# Patient Record
Sex: MALE | Race: WHITE | Employment: OTHER | URBAN - METROPOLITAN AREA
[De-identification: names, ages, dates, MRNs, and addresses within clinical notes are randomized per-mention and may not be internally consistent; named-entity substitution may affect disease eponyms.]

---

## 2017-01-03 ENCOUNTER — TELEPHONE (OUTPATIENT)
Dept: FAMILY MEDICINE CLINIC | Facility: CLINIC | Age: 82
End: 2017-01-03

## 2017-01-08 ENCOUNTER — HOSPITAL ENCOUNTER (EMERGENCY)
Facility: HOSPITAL | Age: 82
Discharge: HOME | End: 2017-01-08
Attending: EMERGENCY MEDICINE
Payer: MEDICARE

## 2017-01-08 VITALS
RESPIRATION RATE: 18 BRPM | WEIGHT: 150 LBS | DIASTOLIC BLOOD PRESSURE: 79 MMHG | BODY MASS INDEX: 24.11 KG/M2 | OXYGEN SATURATION: 94 % | TEMPERATURE: 99 F | HEIGHT: 66 IN | HEART RATE: 77 BPM | SYSTOLIC BLOOD PRESSURE: 131 MMHG
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
## 2017-01-08 PROCEDURE — 99282 EMERGENCY DEPT VISIT SF MDM: CPT

## 2017-01-08 PROCEDURE — 99283 EMERGENCY DEPT VISIT LOW MDM: CPT

## 2017-01-08 RX ORDER — DIAPER,BRIEF,INFANT-TODD,DISP
1 EACH MISCELLANEOUS 2 TIMES DAILY
Qty: 56 G | Refills: 0 | Status: ON HOLD | OUTPATIENT
Start: 2017-01-08 | End: 2018-08-08

## 2017-01-08 NOTE — ED PROVIDER NOTES
Patient Seen in: [facility] Emergency Department    History   Patient presents with:  Constipation (gastrointestinal)  Rash Skin Problem (integumentary)    Stated Complaint: constipation    HPI    59-year-old male presents complaining of rash to upp prostatectomy    OTHER SURGICAL HISTORY      Comment bladder surgery    OTHER SURGICAL HISTORY  1/1/95    Comment testis removed    OTHER SURGICAL HISTORY  6/7/13    Comment Cysto-Dr. Barrett ING HERNIA,5+Y/O,REDU HPI.  Constitutional and vital signs reviewed. All other systems reviewed and negative except as noted above. PSFH elements reviewed from today and agreed except as otherwise stated in HPI.     Physical Exam       ED Triage Vitals   BP 01/08/17 1435 him to check his mattress at home for insects. In the meantime I will prescribe him with hydrocortisone ointment to apply to the area. I have also recommended that he follow-up with dermatology if this is not improving in the next 2-3 days.   In regards t

## 2017-01-08 NOTE — ED NOTES
Pt states he is here today for constipation and red, flat rash to upper extremities. Pt states rash is very itchy and was given medication for it but states it is not working.

## 2017-01-08 NOTE — ED INITIAL ASSESSMENT (HPI)
Pt to ED with c/o constipation. Sts LBM was this am but was not normal. Sts \"was a small amount and I feel miserable. \" Denies N/V. Also c/o rash to bilateral arms. Denies fever.

## 2017-01-20 RX ORDER — SERTRALINE HYDROCHLORIDE 100 MG/1
TABLET, FILM COATED ORAL
Qty: 90 TABLET | Refills: 0 | Status: SHIPPED | OUTPATIENT
Start: 2017-01-20 | End: 2017-04-10

## 2017-01-24 RX ORDER — ALENDRONATE SODIUM 70 MG/1
TABLET ORAL
Qty: 12 TABLET | Refills: 0 | Status: SHIPPED | OUTPATIENT
Start: 2017-01-24 | End: 2017-06-23

## 2017-02-28 ENCOUNTER — TELEPHONE (OUTPATIENT)
Dept: FAMILY MEDICINE CLINIC | Facility: CLINIC | Age: 82
End: 2017-02-28

## 2017-02-28 RX ORDER — BICALUTAMIDE 50 MG/1
TABLET, FILM COATED ORAL
Qty: 90 TABLET | Refills: 0 | Status: SHIPPED | OUTPATIENT
Start: 2017-02-28 | End: 2017-03-21

## 2017-03-01 RX ORDER — BICALUTAMIDE 50 MG/1
TABLET, FILM COATED ORAL
Qty: 90 TABLET | Refills: 0 | Status: SHIPPED | OUTPATIENT
Start: 2017-03-01 | End: 2018-04-25

## 2017-03-06 RX ORDER — LISINOPRIL 5 MG/1
TABLET ORAL
Qty: 30 TABLET | Refills: 0 | Status: SHIPPED | OUTPATIENT
Start: 2017-03-06 | End: 2017-05-15 | Stop reason: SDUPTHER

## 2017-03-06 RX ORDER — FAMOTIDINE 20 MG/1
TABLET ORAL
Qty: 60 TABLET | Refills: 0 | Status: SHIPPED | OUTPATIENT
Start: 2017-03-06 | End: 2017-05-18

## 2017-03-21 ENCOUNTER — OFFICE VISIT (OUTPATIENT)
Dept: FAMILY MEDICINE CLINIC | Facility: CLINIC | Age: 82
End: 2017-03-21

## 2017-03-21 VITALS
BODY MASS INDEX: 26.03 KG/M2 | WEIGHT: 162 LBS | DIASTOLIC BLOOD PRESSURE: 82 MMHG | HEART RATE: 68 BPM | SYSTOLIC BLOOD PRESSURE: 136 MMHG | RESPIRATION RATE: 16 BRPM | HEIGHT: 66 IN | TEMPERATURE: 98 F | OXYGEN SATURATION: 98 %

## 2017-03-21 DIAGNOSIS — R09.82 POST-NASAL DRAINAGE: ICD-10-CM

## 2017-03-21 DIAGNOSIS — R33.9 URINARY RETENTION: ICD-10-CM

## 2017-03-21 DIAGNOSIS — K59.00 CONSTIPATION, UNSPECIFIED CONSTIPATION TYPE: Primary | ICD-10-CM

## 2017-03-21 PROCEDURE — 99214 OFFICE O/P EST MOD 30 MIN: CPT | Performed by: INTERNAL MEDICINE

## 2017-03-21 RX ORDER — GARLIC EXTRACT 500 MG
1 CAPSULE ORAL DAILY
Status: ON HOLD | COMMUNITY
End: 2018-08-08

## 2017-03-21 RX ORDER — LORATADINE 10 MG/1
10 TABLET ORAL DAILY
COMMUNITY
End: 2018-11-23

## 2017-03-21 RX ORDER — ACETAMINOPHEN 500 MG
500 TABLET ORAL EVERY 6 HOURS PRN
Status: ON HOLD | COMMUNITY
End: 2018-07-11

## 2017-03-21 NOTE — PROGRESS NOTES
Shereen Briones is a 80year old male. HPI:   Pt here with chronic constipation. Seen by GI, advised Miralax and glycerin suppositories. Pt reports this isn't working. Continues to feel the need to defecate.  interfering with his social and personal re rhinitis    • Unspecified essential hypertension    • Cancer ([facility] [address]      PROSTATE   • Other and unspecified hyperlipidemia    • Personal history of other musculoskeletal disorders    • OTHER DISEASES    • Colitis      DIVERTICULITIS   • Depression    • [facility]. No orders of the defined types were placed in this encounter.      None      acetaminophen 500 MG Oral Tab  Acidophilus/Pectin Oral Cap  loratadine 10 MG Oral Tab    The patient indicates understanding of these issues and agre

## 2017-03-22 ENCOUNTER — TELEPHONE (OUTPATIENT)
Dept: FAMILY MEDICINE CLINIC | Facility: CLINIC | Age: 82
End: 2017-03-22

## 2017-03-23 PROCEDURE — 87086 URINE CULTURE/COLONY COUNT: CPT | Performed by: UROLOGY

## 2017-04-07 ENCOUNTER — TELEPHONE (OUTPATIENT)
Dept: FAMILY MEDICINE CLINIC | Facility: CLINIC | Age: 82
End: 2017-04-07

## 2017-04-07 RX ORDER — ALPRAZOLAM 0.5 MG/1
TABLET ORAL
Qty: 30 TABLET | Refills: 0 | Status: ON HOLD | COMMUNITY
Start: 2017-04-07 | End: 2018-07-11

## 2017-04-07 NOTE — TELEPHONE ENCOUNTER
Obtained RX from [facility]. Spoke with Ismael Echevarria, friend of Stacy Barlow, and informed her [facility] has prescribed the generic for Xanax, it has been phoned into pt's [facility] and also gave warnings about drowsiness, increased fall risk.  She expressed understanding and agreeme

## 2017-04-11 RX ORDER — SERTRALINE HYDROCHLORIDE 100 MG/1
TABLET, FILM COATED ORAL
Qty: 90 TABLET | Refills: 0 | Status: SHIPPED | OUTPATIENT
Start: 2017-04-11 | End: 2017-07-08

## 2017-04-11 RX ORDER — LISINOPRIL 5 MG/1
TABLET ORAL
Qty: 30 TABLET | Refills: 0 | Status: SHIPPED | OUTPATIENT
Start: 2017-04-11 | End: 2017-05-04

## 2017-05-04 NOTE — PROGRESS NOTES
Patient is here for medication refills. He suffers from anxiety but is now successfully off alprazolam.  He is on sertraline.   He suffers from irritable bowel-like symptoms that are rather disabling he is on sertraline and 100 mg daily and this can produc

## 2017-05-15 RX ORDER — LISINOPRIL 5 MG/1
TABLET ORAL
Qty: 90 TABLET | Refills: 1 | Status: SHIPPED | OUTPATIENT
Start: 2017-05-15 | End: 2017-10-29

## 2017-05-18 RX ORDER — FAMOTIDINE 20 MG/1
TABLET ORAL
Qty: 180 TABLET | Refills: 0 | Status: SHIPPED | OUTPATIENT
Start: 2017-05-18 | End: 2017-08-19

## 2017-06-01 RX ORDER — BICALUTAMIDE 50 MG/1
TABLET, FILM COATED ORAL
Qty: 90 TABLET | Refills: 0 | Status: SHIPPED | OUTPATIENT
Start: 2017-06-01 | End: 2018-03-02

## 2017-06-05 RX ORDER — FAMOTIDINE 20 MG/1
TABLET ORAL
Qty: 180 TABLET | Refills: 0 | OUTPATIENT
Start: 2017-06-05

## 2017-06-11 ENCOUNTER — APPOINTMENT (OUTPATIENT)
Dept: GENERAL RADIOLOGY | Age: 82
End: 2017-06-11
Attending: EMERGENCY MEDICINE
Payer: MEDICARE

## 2017-06-11 ENCOUNTER — HOSPITAL ENCOUNTER (EMERGENCY)
Age: 82
Discharge: HOME | End: 2017-06-11
Attending: EMERGENCY MEDICINE
Payer: MEDICARE

## 2017-06-11 VITALS
DIASTOLIC BLOOD PRESSURE: 65 MMHG | RESPIRATION RATE: 16 BRPM | OXYGEN SATURATION: 100 % | SYSTOLIC BLOOD PRESSURE: 142 MMHG | TEMPERATURE: 99 F | HEART RATE: 78 BPM | HEIGHT: 67 IN

## 2017-06-11 DIAGNOSIS — K59.00 CONSTIPATION, UNSPECIFIED CONSTIPATION TYPE: ICD-10-CM

## 2017-06-11 DIAGNOSIS — N39.0 URINARY TRACT INFECTION WITHOUT HEMATURIA, SITE UNSPECIFIED: Primary | ICD-10-CM

## 2017-06-11 PROCEDURE — 81001 URINALYSIS AUTO W/SCOPE: CPT | Performed by: EMERGENCY MEDICINE

## 2017-06-11 PROCEDURE — 74000 XR ABDOMEN (KUB) (1 AP VIEW)  (CPT=74000): CPT | Performed by: EMERGENCY MEDICINE

## 2017-06-11 PROCEDURE — 87186 SC STD MICRODIL/AGAR DIL: CPT | Performed by: EMERGENCY MEDICINE

## 2017-06-11 PROCEDURE — 87077 CULTURE AEROBIC IDENTIFY: CPT | Performed by: EMERGENCY MEDICINE

## 2017-06-11 PROCEDURE — 99284 EMERGENCY DEPT VISIT MOD MDM: CPT

## 2017-06-11 PROCEDURE — 87086 URINE CULTURE/COLONY COUNT: CPT | Performed by: EMERGENCY MEDICINE

## 2017-06-11 PROCEDURE — 80053 COMPREHEN METABOLIC PANEL: CPT | Performed by: EMERGENCY MEDICINE

## 2017-06-11 PROCEDURE — 96361 HYDRATE IV INFUSION ADD-ON: CPT

## 2017-06-11 PROCEDURE — 96360 HYDRATION IV INFUSION INIT: CPT

## 2017-06-11 PROCEDURE — 85025 COMPLETE CBC W/AUTO DIFF WBC: CPT | Performed by: EMERGENCY MEDICINE

## 2017-06-11 PROCEDURE — 71020 XR CHEST PA + LAT CHEST (CPT=71020): CPT | Performed by: EMERGENCY MEDICINE

## 2017-06-11 RX ORDER — CIPROFLOXACIN 500 MG/1
500 TABLET, FILM COATED ORAL 2 TIMES DAILY
Qty: 20 TABLET | Refills: 0 | Status: SHIPPED | OUTPATIENT
Start: 2017-06-11 | End: 2017-06-21

## 2017-06-11 RX ORDER — LISINOPRIL 10 MG/1
10 TABLET ORAL DAILY
Status: ON HOLD | COMMUNITY
End: 2018-07-11

## 2017-06-11 RX ORDER — POLYETHYLENE GLYCOL 3350 17 G/17G
17 POWDER, FOR SOLUTION ORAL DAILY PRN
Qty: 12 EACH | Refills: 0 | Status: SHIPPED | OUTPATIENT
Start: 2017-06-11 | End: 2017-07-11

## 2017-06-11 NOTE — ED PROVIDER NOTES
Patient Seen in: [facility] Emergency Department In [place]    History   Patient presents with:  Dehydration (metabolic/constitutional)    Stated Complaint: weakness, loss of appetite    HPI    80-year-old male presents for evaluation of weakness.   Viktor ESOPHAGOGASTRODUODENOSCOPY (EGD); Surgeon: Ana Lundberg MD;  Location:  ENDOSCOPY       Medications :   lisinopril 10 MG Oral Tab,  Take 10 mg by mouth daily.    Ciprofloxacin HCl 500 MG Oral Tab,  Take 1 tablet (500 mg total) by mouth 2 (two) times 0928 135/87 mmHg   Pulse 06/11/17 0928 104   Resp 06/11/17 0928 18   Temp 06/11/17 0928 98.7 °F (37.1 °C)   Temp src 06/11/17 0928 Temporal   SpO2 06/11/17 0928 96 %   O2 Device 06/11/17 0928 None (Room air)       Current:/65 mmHg  Pulse 78  Temp(Src Final result                 Please view results for these tests on the individual orders.    RAINBOW DRAW BLUE   RAINBOW DRAW GOLD   URINE CULTURE, ROUTINE   CBC W/ DIFFERENTIAL       MDM     Medications   sodium chloride 0.9% IV bolus 1,000 mL (0 m

## 2017-06-11 NOTE — ED INITIAL ASSESSMENT (HPI)
Pt sts \"I have had nasal congestion for weeks, feeling weak, losing appetite but able to eat and drink, has been constipated\", denies nausea/vomiting/pain

## 2017-06-14 RX ORDER — SULFAMETHOXAZOLE AND TRIMETHOPRIM 800; 160 MG/1; MG/1
1 TABLET ORAL 2 TIMES DAILY
Qty: 20 TABLET | Refills: 0 | Status: SHIPPED | OUTPATIENT
Start: 2017-06-14 | End: 2017-06-24

## 2017-06-14 NOTE — ED NOTES
RECEIVED FINAL RESULT- PT IS RESISTANT TO CIPRO, CHANGED IT TO BACTRIM. CALLED PT AND SON AND INFORMED OF CHANGES. BACTRIM RX CALLED IN TO [address] [place] [address].

## 2017-06-15 ENCOUNTER — TELEPHONE (OUTPATIENT)
Dept: FAMILY MEDICINE CLINIC | Facility: CLINIC | Age: 82
End: 2017-06-15

## 2017-06-15 NOTE — TELEPHONE ENCOUNTER
Pt called to cancel his appt on the same day, pt stated he is feeling sick and does not feel like leaving his home, pt was offered to come in to be treated for his sickness. Pt refused and cancelled the appt.  Pt aware of no show policy and aware that he wi

## 2017-06-27 RX ORDER — ALENDRONATE SODIUM 70 MG/1
TABLET ORAL
Qty: 12 TABLET | Refills: 0 | Status: SHIPPED | OUTPATIENT
Start: 2017-06-27 | End: 2017-09-10

## 2017-07-10 RX ORDER — SERTRALINE HYDROCHLORIDE 100 MG/1
TABLET, FILM COATED ORAL
Qty: 90 TABLET | Refills: 0 | Status: SHIPPED | OUTPATIENT
Start: 2017-07-10 | End: 2018-08-16

## 2017-08-10 PROCEDURE — 87086 URINE CULTURE/COLONY COUNT: CPT | Performed by: UROLOGY

## 2017-08-19 DIAGNOSIS — F41.1 GENERALIZED ANXIETY DISORDER: ICD-10-CM

## 2017-08-21 RX ORDER — TRAZODONE HYDROCHLORIDE 50 MG/1
TABLET ORAL
Qty: 90 TABLET | Refills: 0 | Status: SHIPPED | OUTPATIENT
Start: 2017-08-21 | End: 2017-11-19

## 2017-08-21 RX ORDER — FAMOTIDINE 20 MG/1
TABLET ORAL
Qty: 180 TABLET | Refills: 0 | Status: SHIPPED | OUTPATIENT
Start: 2017-08-21 | End: 2017-11-19

## 2017-08-21 RX ORDER — BICALUTAMIDE 50 MG/1
TABLET, FILM COATED ORAL
Qty: 90 TABLET | Refills: 0 | Status: SHIPPED | OUTPATIENT
Start: 2017-08-21 | End: 2017-11-16

## 2017-09-05 ENCOUNTER — OFFICE VISIT (OUTPATIENT)
Dept: SURGERY | Facility: CLINIC | Age: 82
End: 2017-09-05

## 2017-09-05 VITALS
WEIGHT: 155 LBS | RESPIRATION RATE: 16 BRPM | HEIGHT: 67 IN | BODY MASS INDEX: 24.33 KG/M2 | TEMPERATURE: 98 F | DIASTOLIC BLOOD PRESSURE: 77 MMHG | SYSTOLIC BLOOD PRESSURE: 115 MMHG | HEART RATE: 72 BPM

## 2017-09-05 DIAGNOSIS — K59.00 CONSTIPATION, UNSPECIFIED CONSTIPATION TYPE: Primary | ICD-10-CM

## 2017-09-05 DIAGNOSIS — R19.8 TENESMUS (RECTAL): ICD-10-CM

## 2017-09-05 PROCEDURE — 99203 OFFICE O/P NEW LOW 30 MIN: CPT | Performed by: COLON & RECTAL SURGERY

## 2017-09-05 PROCEDURE — 46600 DIAGNOSTIC ANOSCOPY SPX: CPT | Performed by: COLON & RECTAL SURGERY

## 2017-09-05 RX ORDER — POLYETHYLENE GLYCOL 3350, SODIUM CHLORIDE, SODIUM BICARBONATE, POTASSIUM CHLORIDE 420; 11.2; 5.72; 1.48 G/4L; G/4L; G/4L; G/4L
POWDER, FOR SOLUTION ORAL
Qty: 1 BOTTLE | Refills: 0 | Status: SHIPPED | OUTPATIENT
Start: 2017-09-05 | End: 2018-03-02 | Stop reason: ALTCHOICE

## 2017-09-05 NOTE — PATIENT INSTRUCTIONS
Currently the patient is weary of undergoing colonoscopy given his age. We discussed a less invasive initial approach. We will order a fecal occult blood test in the mean time which may push us to colonoscopy sooner.  We will start with a high fiber diet an

## 2017-09-05 NOTE — H&P
New Patient Visit Note       Active Problems      1. Constipation, unspecified constipation type    2.  Tenesmus (rectal)        Chief Complaint   Constipation and urge to defecate    History of Present Illness     Audra Louie is an 81yo gentleman refe patient does not have a family history of colon cancer in second degree relatives such as grandparents, aunts uncles, cousins, nieces, and nephews.   The patient does not have a family history of uterine cancer in a Mother, Sister, Child or in second degree Onset   • Cancer Father    • Stroke Mother    • MS[other] [OTHER] Daughter      Social History    Marital status:             Spouse name:                       Years of education:                 Number of children:               Social History Ma BY MOUTH EVERY DAY Disp: 90 tablet Rfl: 0   hydrocortisone 1 % External Ointment Apply 1 Application topically 2 (two) times daily. Disp: 56 g Rfl: 0   Saw Palmetto 1000 MG Oral Cap Take by mouth.  Disp:  Rfl:          Review of Systems  The Review of Syste Lymphadenopathy:     He has no cervical adenopathy. Neurological: He is alert and oriented to person, place, and time. Skin: He is not diaphoretic. Psychiatric: He has a normal mood and affect. The perineum was examined.  There was a small amou Referrals   None    Follow Up  Return in about 3 months (around 12/5/2017).     Long Michelle MD

## 2017-09-11 RX ORDER — ALENDRONATE SODIUM 70 MG/1
TABLET ORAL
Qty: 12 TABLET | Refills: 0 | Status: SHIPPED | OUTPATIENT
Start: 2017-09-11 | End: 2017-11-25

## 2017-09-12 ENCOUNTER — PATIENT OUTREACH (OUTPATIENT)
Dept: CASE MANAGEMENT | Age: 82
End: 2017-09-12

## 2017-10-30 RX ORDER — LISINOPRIL 5 MG/1
TABLET ORAL
Qty: 90 TABLET | Refills: 0 | Status: SHIPPED | OUTPATIENT
Start: 2017-10-30 | End: 2018-01-25

## 2017-11-16 ENCOUNTER — OFFICE VISIT (OUTPATIENT)
Dept: FAMILY MEDICINE CLINIC | Facility: CLINIC | Age: 82
End: 2017-11-16

## 2017-11-16 VITALS
BODY MASS INDEX: 24.43 KG/M2 | HEART RATE: 90 BPM | HEIGHT: 66 IN | SYSTOLIC BLOOD PRESSURE: 128 MMHG | WEIGHT: 152 LBS | RESPIRATION RATE: 16 BRPM | DIASTOLIC BLOOD PRESSURE: 82 MMHG | TEMPERATURE: 98 F

## 2017-11-16 DIAGNOSIS — Z00.00 ROUTINE GENERAL MEDICAL EXAMINATION AT A HEALTH CARE FACILITY: ICD-10-CM

## 2017-11-16 DIAGNOSIS — Z00.00 LABORATORY EXAMINATION ORDERED AS PART OF A ROUTINE GENERAL MEDICAL EXAMINATION: Primary | ICD-10-CM

## 2017-11-16 DIAGNOSIS — I10 ESSENTIAL HYPERTENSION: ICD-10-CM

## 2017-11-16 DIAGNOSIS — C61 PROSTATE CANCER (HCC): ICD-10-CM

## 2017-11-16 PROCEDURE — 99213 OFFICE O/P EST LOW 20 MIN: CPT | Performed by: FAMILY MEDICINE

## 2017-11-16 PROCEDURE — G0439 PPPS, SUBSEQ VISIT: HCPCS | Performed by: FAMILY MEDICINE

## 2017-11-16 NOTE — PROGRESS NOTES
Here for annual Medicare wellness exam.    The entire appropriate questions were asked. All medications were reviewed.   These include alendronate 70 mg, Xanax, lisinopril for hypertension, famotidine for reflux esophagitis, and sertraline for anxiety an

## 2017-11-19 DIAGNOSIS — F41.1 GENERALIZED ANXIETY DISORDER: ICD-10-CM

## 2017-11-20 ENCOUNTER — TELEPHONE (OUTPATIENT)
Dept: FAMILY MEDICINE CLINIC | Facility: CLINIC | Age: 82
End: 2017-11-20

## 2017-11-20 RX ORDER — FAMOTIDINE 20 MG/1
TABLET ORAL
Qty: 180 TABLET | Refills: 0 | Status: SHIPPED | OUTPATIENT
Start: 2017-11-20 | End: 2018-02-20

## 2017-11-20 RX ORDER — TRAZODONE HYDROCHLORIDE 50 MG/1
TABLET ORAL
Qty: 90 TABLET | Refills: 0 | Status: SHIPPED | OUTPATIENT
Start: 2017-11-20 | End: 2018-02-20

## 2017-11-20 NOTE — TELEPHONE ENCOUNTER
Wants lab orders faxed to them, they have a facility and they can do it onsite for the patient, instead of [facility], fax # [phone]

## 2017-11-21 NOTE — PROGRESS NOTES
Remy Torrez is a 80year old male who presents for a Medicare Annual Wellness visit.     Patient Care Team: Patient Care Team:  Andi uFller DO as PCP - [facility])    Patient Active Problem List:     Disorders of bursae and tendon abdominal pain     Hypernatremia     Esophageal reflux     Lumbago      Current Outpatient Prescriptions:  LISINOPRIL 5 MG Oral Tab TAKE 1 TABLET(5 MG) BY MOUTH DAILY Disp: 90 tablet Rfl: 0   Trospium Chloride ER 60 MG Oral Capsule SR 24 Hr Take 1 capsule results found for: CHOLEST  No results found for: HDL  No results found for: TRIG, TRIGLY  No results found for: LDL    Lab Results  Component Value Date   AST 33 06/11/2017   AST 16 09/30/2016   AST 8 (L) 12/16/2015       Lab Results  Component Value Date 12 months?: 0-No                                        Fall/Risk Scorin          Depression Screening (PHQ-2/PHQ-9): Over the LAST 2 WEEKS   Little interest or pleasure in doing things (over the last two weeks)?: Not at all    Feeling down, depressed, patient. Update Health Maintenance if applicable   Immunizations      Influenza No orders found for this or any previous visit.  Update Immunization Activity if applicable    Pneumococcal   Orders placed or performed in visit on 10/11/16  -PNEUMOCOCCAL VAC ALENDRONATE SODIUM 70 MG Oral Tab TAKE 1 TABLET BY MOUTH EVERY SATURDAY ON AN EMPTY STOMACH Disp: 12 tablet Rfl: 0   PEG 3350-KCl-Na Bicarb-NaCl (TRILYTE) 420 g Oral Recon Soln Starting at 4:00 pm the night before procedure, drink 8 ounces of the prep ev • Personal history of antineoplastic chemotherapy     1980S   • Personal history of contact with and (suspected) exposure to asbestos    • Unspecified essential hypertension    • Visual impairment     glasses      Past Surgical History:  6/3/11: BACK CHETAN WITH DIFFERENTIAL WITH PLATELET  -     COMP METABOLIC PANEL (14)  -     PSA  -     LIPID PANEL  -     TSH W REFLEX TO FREE T4    Prostate cancer ([facility] [address]  Labs ordered  Survivor doing well. He is neurologically intact.   Essential hypertension  Labs ordered

## 2017-11-26 RX ORDER — BICALUTAMIDE 50 MG/1
TABLET, FILM COATED ORAL
Qty: 90 TABLET | Refills: 0 | Status: SHIPPED | OUTPATIENT
Start: 2017-11-26 | End: 2018-03-02

## 2017-11-26 RX ORDER — ALENDRONATE SODIUM 70 MG/1
TABLET ORAL
Qty: 12 TABLET | Refills: 0 | Status: SHIPPED | OUTPATIENT
Start: 2017-11-26 | End: 2018-02-16

## 2018-01-25 RX ORDER — LISINOPRIL 5 MG/1
TABLET ORAL
Qty: 90 TABLET | Refills: 1 | Status: ON HOLD | OUTPATIENT
Start: 2018-01-25 | End: 2018-07-11

## 2018-02-16 DIAGNOSIS — M81.0 OSTEOPOROSIS, UNSPECIFIED OSTEOPOROSIS TYPE, UNSPECIFIED PATHOLOGICAL FRACTURE PRESENCE: Primary | ICD-10-CM

## 2018-02-16 RX ORDER — ALENDRONATE SODIUM 70 MG/1
TABLET ORAL
Qty: 12 TABLET | Refills: 0 | Status: SHIPPED | OUTPATIENT
Start: 2018-02-16 | End: 2018-05-11

## 2018-02-20 DIAGNOSIS — F41.1 GENERALIZED ANXIETY DISORDER: ICD-10-CM

## 2018-02-20 RX ORDER — TRAZODONE HYDROCHLORIDE 50 MG/1
TABLET ORAL
Qty: 90 TABLET | Refills: 0 | Status: SHIPPED | OUTPATIENT
Start: 2018-02-20 | End: 2018-05-20

## 2018-02-20 RX ORDER — FAMOTIDINE 20 MG/1
TABLET ORAL
Qty: 180 TABLET | Refills: 0 | Status: SHIPPED | OUTPATIENT
Start: 2018-02-20 | End: 2018-05-20

## 2018-03-02 ENCOUNTER — OFFICE VISIT (OUTPATIENT)
Dept: FAMILY MEDICINE CLINIC | Facility: CLINIC | Age: 83
End: 2018-03-02

## 2018-03-02 VITALS
TEMPERATURE: 98 F | BODY MASS INDEX: 24.11 KG/M2 | RESPIRATION RATE: 18 BRPM | DIASTOLIC BLOOD PRESSURE: 74 MMHG | HEART RATE: 90 BPM | SYSTOLIC BLOOD PRESSURE: 126 MMHG | OXYGEN SATURATION: 98 % | HEIGHT: 66 IN | WEIGHT: 150 LBS

## 2018-03-02 DIAGNOSIS — J31.0 CHRONIC RHINITIS: ICD-10-CM

## 2018-03-02 DIAGNOSIS — K59.09 OTHER CONSTIPATION: Primary | ICD-10-CM

## 2018-03-02 DIAGNOSIS — F34.1 DYSTHYMIA: ICD-10-CM

## 2018-03-02 PROCEDURE — 99214 OFFICE O/P EST MOD 30 MIN: CPT | Performed by: PHYSICIAN ASSISTANT

## 2018-03-02 RX ORDER — FEXOFENADINE HCL 180 MG/1
TABLET ORAL
Refills: 2 | COMMUNITY
Start: 2018-02-02 | End: 2018-08-16

## 2018-03-02 RX ORDER — BICALUTAMIDE 50 MG/1
TABLET, FILM COATED ORAL
Qty: 90 TABLET | Refills: 0 | Status: ON HOLD | OUTPATIENT
Start: 2018-03-02 | End: 2018-07-11

## 2018-03-02 RX ORDER — FLUTICASONE PROPIONATE 50 MCG
SPRAY, SUSPENSION (ML) NASAL
Refills: 0 | COMMUNITY
Start: 2018-02-01 | End: 2018-04-27

## 2018-03-02 RX ORDER — ESCITALOPRAM OXALATE 5 MG/1
TABLET ORAL
Refills: 0 | Status: ON HOLD | COMMUNITY
Start: 2017-12-05 | End: 2021-03-03

## 2018-03-02 NOTE — PROGRESS NOTES
HPI:   Tanya Ash is a 80year old male who presents for nasal congestion for 1 year. Patient reports congestion, clear  colored nasal discharge. Drainage causes him to cough. He is not taking any medications for this.  States he is always congeste mouth every 12 hours Disp: 30 tablet Rfl: 0   acetaminophen 500 MG Oral Tab Take 500 mg by mouth every 6 (six) hours as needed for Pain. Disp:  Rfl:    Acidophilus/Pectin Oral Cap Take 1 capsule by mouth daily.  Disp:  Rfl:    loratadine 10 MG Oral Tab Take denies ear pain, denies sore throat  LUNGS: denies shortness of breath, +cough from post nasal drip, denies wheezing  CV: denies chest pain on exertion  GI: denies nausea, no abdominal pain, +chronic constipation  NEURO: denies headaches, denies dizziness

## 2018-04-17 ENCOUNTER — TELEPHONE (OUTPATIENT)
Dept: FAMILY MEDICINE CLINIC | Facility: CLINIC | Age: 83
End: 2018-04-17

## 2018-04-17 NOTE — TELEPHONE ENCOUNTER
Called pt, offered to schedule pt with ANDI, gave pt 3 different day/times for next week. He will check when his bus is available and call back.

## 2018-04-17 NOTE — TELEPHONE ENCOUNTER
Patient explained that he is a cancer patient and is having issues with his bowels. He wants to know if he should see Dr. Zaida Little or if he would recommend another doctor to help him with the issue. Combination of Diarrhea and constipation.   He is afraid to l

## 2018-04-25 ENCOUNTER — OFFICE VISIT (OUTPATIENT)
Dept: FAMILY MEDICINE CLINIC | Facility: CLINIC | Age: 83
End: 2018-04-25

## 2018-04-25 VITALS
BODY MASS INDEX: 24.91 KG/M2 | TEMPERATURE: 98 F | SYSTOLIC BLOOD PRESSURE: 134 MMHG | HEART RATE: 70 BPM | WEIGHT: 155 LBS | DIASTOLIC BLOOD PRESSURE: 86 MMHG | RESPIRATION RATE: 18 BRPM | HEIGHT: 66 IN | OXYGEN SATURATION: 98 %

## 2018-04-25 DIAGNOSIS — K52.9 ENTERITIS: Primary | ICD-10-CM

## 2018-04-25 PROCEDURE — 99213 OFFICE O/P EST LOW 20 MIN: CPT | Performed by: FAMILY MEDICINE

## 2018-04-25 RX ORDER — SENNOSIDES 8.6 MG
8.6 TABLET ORAL 2 TIMES DAILY
COMMUNITY
End: 2019-05-01

## 2018-04-25 NOTE — PROGRESS NOTES
Has nonspecific abdominal pain with alternating constipation and diarrhea his appetite however is intact and there is no point localized pain.     Also was getting over bronchitis on today's exam vital signs normal he is upset because his son is leaving tow

## 2018-04-26 ENCOUNTER — TELEPHONE (OUTPATIENT)
Dept: FAMILY MEDICINE CLINIC | Facility: CLINIC | Age: 83
End: 2018-04-26

## 2018-04-26 NOTE — TELEPHONE ENCOUNTER
Pt stated he was here yesterday for an appt with carlos manuel Bruce and pt was told to start a trial of probiotics a pain medication and fluticasone nasonex nasal spray. Pt wants to know if all those medications where sent to his pharmacy in [address] [facility]?  Pt is no

## 2018-04-27 RX ORDER — FLUTICASONE PROPIONATE 50 MCG
SPRAY, SUSPENSION (ML) NASAL
Qty: 1 BOTTLE | Refills: 0 | Status: SHIPPED | OUTPATIENT
Start: 2018-04-27 | End: 2018-07-28

## 2018-04-27 RX ORDER — FLUTICASONE PROPIONATE 50 MCG
SPRAY, SUSPENSION (ML) NASAL
Qty: 1 BOTTLE | Refills: 0 | Status: SHIPPED | OUTPATIENT
Start: 2018-04-27 | End: 2018-04-27

## 2018-04-27 NOTE — TELEPHONE ENCOUNTER
We are trying to avoid giving this gentleman mood altering medicines since he has a remote history of alcohol abuse and confusion at times.   If he has specific needs for pain medicine and we can handle those on a case-by-case basis and he can feel free to

## 2018-04-27 NOTE — TELEPHONE ENCOUNTER
Patient states he spoke with Cassandra Ramirez about pain medication at time of last OV. He is a cancer patient and has painful procedures done frequently. He  does not want Tylenol with codeine as this medication causes constipation. Please advise. Thanks.

## 2018-04-27 NOTE — TELEPHONE ENCOUNTER
Fluticasone is over the counter      He was unsure of his current meds    Moderate cognitive  impairment

## 2018-05-11 DIAGNOSIS — M81.8 OTHER OSTEOPOROSIS, UNSPECIFIED PATHOLOGICAL FRACTURE PRESENCE: Primary | ICD-10-CM

## 2018-05-11 RX ORDER — ALENDRONATE SODIUM 70 MG/1
TABLET ORAL
Qty: 12 TABLET | Refills: 0 | Status: ON HOLD | OUTPATIENT
Start: 2018-05-11 | End: 2018-07-11

## 2018-05-20 DIAGNOSIS — F41.1 GENERALIZED ANXIETY DISORDER: ICD-10-CM

## 2018-05-21 RX ORDER — TRAZODONE HYDROCHLORIDE 50 MG/1
TABLET ORAL
Qty: 90 TABLET | Refills: 0 | Status: SHIPPED | OUTPATIENT
Start: 2018-05-21 | End: 2018-08-19

## 2018-05-21 RX ORDER — FAMOTIDINE 20 MG/1
TABLET ORAL
Qty: 180 TABLET | Refills: 0 | Status: SHIPPED | OUTPATIENT
Start: 2018-05-21 | End: 2018-08-16

## 2018-06-14 RX ORDER — BICALUTAMIDE 50 MG/1
TABLET, FILM COATED ORAL
Qty: 90 TABLET | Refills: 0 | Status: SHIPPED | OUTPATIENT
Start: 2018-06-14 | End: 2018-11-23

## 2018-07-08 ENCOUNTER — HOSPITAL ENCOUNTER (INPATIENT)
Facility: HOSPITAL | Age: 83
LOS: 3 days | Discharge: NURSING, REHAB OR LONG-TERM CARE | DRG: 470 | End: 2018-07-11
Attending: HOSPITALIST | Admitting: HOSPITALIST
Payer: MEDICARE

## 2018-07-08 ENCOUNTER — APPOINTMENT (OUTPATIENT)
Dept: GENERAL RADIOLOGY | Facility: HOSPITAL | Age: 83
DRG: 470 | End: 2018-07-08
Attending: ORTHOPAEDIC SURGERY
Payer: MEDICARE

## 2018-07-08 ENCOUNTER — ANESTHESIA EVENT (OUTPATIENT)
Dept: SURGERY | Facility: HOSPITAL | Age: 83
DRG: 470 | End: 2018-07-08
Payer: MEDICARE

## 2018-07-08 DIAGNOSIS — S72.141A INTERTROCHANTERIC FRACTURE OF RIGHT HIP (HCC): ICD-10-CM

## 2018-07-08 PROBLEM — S72.009A HIP FRACTURE (HCC): Status: ACTIVE | Noted: 2018-07-08

## 2018-07-08 PROCEDURE — 73502 X-RAY EXAM HIP UNI 2-3 VIEWS: CPT | Performed by: ORTHOPAEDIC SURGERY

## 2018-07-08 PROCEDURE — 99223 1ST HOSP IP/OBS HIGH 75: CPT | Performed by: HOSPITALIST

## 2018-07-08 RX ORDER — TRAZODONE HYDROCHLORIDE 50 MG/1
50 TABLET ORAL NIGHTLY
Status: DISCONTINUED | OUTPATIENT
Start: 2018-07-08 | End: 2018-07-11

## 2018-07-08 RX ORDER — HYDROCODONE BITARTRATE AND ACETAMINOPHEN 5; 325 MG/1; MG/1
1 TABLET ORAL EVERY 4 HOURS PRN
Status: DISCONTINUED | OUTPATIENT
Start: 2018-07-08 | End: 2018-07-11

## 2018-07-08 RX ORDER — ACETAMINOPHEN 325 MG/1
650 TABLET ORAL EVERY 6 HOURS PRN
Status: DISCONTINUED | OUTPATIENT
Start: 2018-07-08 | End: 2018-07-11

## 2018-07-08 RX ORDER — HYDROCODONE BITARTRATE AND ACETAMINOPHEN 5; 325 MG/1; MG/1
2 TABLET ORAL EVERY 4 HOURS PRN
Status: DISCONTINUED | OUTPATIENT
Start: 2018-07-08 | End: 2018-07-11

## 2018-07-08 RX ORDER — SODIUM CHLORIDE, SODIUM LACTATE, POTASSIUM CHLORIDE, CALCIUM CHLORIDE 600; 310; 30; 20 MG/100ML; MG/100ML; MG/100ML; MG/100ML
INJECTION, SOLUTION INTRAVENOUS CONTINUOUS
Status: DISCONTINUED | OUTPATIENT
Start: 2018-07-08 | End: 2018-07-11

## 2018-07-08 RX ORDER — TRAZODONE HYDROCHLORIDE 50 MG/1
50 TABLET ORAL NIGHTLY PRN
Status: DISCONTINUED | OUTPATIENT
Start: 2018-07-08 | End: 2018-07-11

## 2018-07-08 RX ORDER — OXYBUTYNIN CHLORIDE 5 MG/1
5 TABLET, EXTENDED RELEASE ORAL DAILY
Status: DISCONTINUED | OUTPATIENT
Start: 2018-07-08 | End: 2018-07-11

## 2018-07-08 RX ORDER — ONDANSETRON 2 MG/ML
8 INJECTION INTRAMUSCULAR; INTRAVENOUS EVERY 6 HOURS PRN
Status: DISCONTINUED | OUTPATIENT
Start: 2018-07-08 | End: 2018-07-11

## 2018-07-08 RX ORDER — ACETAMINOPHEN 325 MG/1
650 TABLET ORAL EVERY 4 HOURS PRN
Status: DISCONTINUED | OUTPATIENT
Start: 2018-07-08 | End: 2018-07-11

## 2018-07-08 RX ORDER — MORPHINE SULFATE 4 MG/ML
2 INJECTION, SOLUTION INTRAMUSCULAR; INTRAVENOUS ONCE
Status: COMPLETED | OUTPATIENT
Start: 2018-07-08 | End: 2018-07-08

## 2018-07-08 RX ORDER — ENOXAPARIN SODIUM 100 MG/ML
40 INJECTION SUBCUTANEOUS DAILY
Status: DISCONTINUED | OUTPATIENT
Start: 2018-07-09 | End: 2018-07-09 | Stop reason: ALTCHOICE

## 2018-07-08 RX ORDER — BICALUTAMIDE 50 MG/1
50 TABLET, FILM COATED ORAL DAILY
Status: DISCONTINUED | OUTPATIENT
Start: 2018-07-09 | End: 2018-07-11

## 2018-07-08 RX ORDER — MORPHINE SULFATE 4 MG/ML
1 INJECTION, SOLUTION INTRAMUSCULAR; INTRAVENOUS EVERY 2 HOUR PRN
Status: DISCONTINUED | OUTPATIENT
Start: 2018-07-08 | End: 2018-07-11

## 2018-07-08 RX ORDER — MORPHINE SULFATE 4 MG/ML
INJECTION, SOLUTION INTRAMUSCULAR; INTRAVENOUS
Status: DISPENSED
Start: 2018-07-08 | End: 2018-07-09

## 2018-07-08 RX ORDER — CETIRIZINE HYDROCHLORIDE 10 MG/1
10 TABLET ORAL DAILY
Status: DISCONTINUED | OUTPATIENT
Start: 2018-07-08 | End: 2018-07-11

## 2018-07-08 RX ORDER — FAMOTIDINE 20 MG/1
20 TABLET ORAL 2 TIMES DAILY
Status: DISCONTINUED | OUTPATIENT
Start: 2018-07-08 | End: 2018-07-11

## 2018-07-08 RX ORDER — ALPRAZOLAM 0.5 MG/1
0.5 TABLET ORAL 3 TIMES DAILY PRN
Status: DISCONTINUED | OUTPATIENT
Start: 2018-07-08 | End: 2018-07-11

## 2018-07-08 RX ORDER — ESCITALOPRAM OXALATE 5 MG/1
5 TABLET ORAL NIGHTLY
Status: DISCONTINUED | OUTPATIENT
Start: 2018-07-08 | End: 2018-07-11

## 2018-07-08 RX ORDER — METOCLOPRAMIDE HYDROCHLORIDE 5 MG/ML
10 INJECTION INTRAMUSCULAR; INTRAVENOUS EVERY 8 HOURS PRN
Status: DISCONTINUED | OUTPATIENT
Start: 2018-07-08 | End: 2018-07-09

## 2018-07-08 RX ORDER — MORPHINE SULFATE 4 MG/ML
2 INJECTION, SOLUTION INTRAMUSCULAR; INTRAVENOUS EVERY 2 HOUR PRN
Status: DISCONTINUED | OUTPATIENT
Start: 2018-07-08 | End: 2018-07-11

## 2018-07-08 NOTE — H&P
[facility]  History and Physical     Tani Adan Patient Status:  Inpatient    12/15/1931 MRN [id number]   [facility] 3SW-A Attending [place] [facility] Day # 0 PCP Roderick Edwards DO     Chief Complaint: fal stimulator placed  9/17/2014: OTHER SURGICAL HISTORY      Comment: Procedure: ESOPHAGOGASTRODUODENOSCOPY (EGD);                  Surgeon: Jignesh Live MD;  Location: [facility]                ENDOSCOPY  No date: REPAIR ING HERNIA,5+Y/O,REDUCIBL    Social Histo tablet Rfl: 0   lisinopril 10 MG Oral Tab Take 10 mg by mouth daily.  Disp:  Rfl:    ALPRAZolam (XANAX) 0.5 MG Oral Tab Take 1 to 2 tablets by mouth every 12 hours Disp: 30 tablet Rfl: 0   acetaminophen 500 MG Oral Tab Take 500 mg by mouth every 6 (six) danielle older than the maximum 7 days allowed. ). No results for input(s): PTP, INR in the last 168 hours. No results for input(s): TROP, CK in the last 168 hours. Imaging: Imaging data reviewed in Epic. ASSESSMENT / PLAN:     1.  Right hip fracture af

## 2018-07-08 NOTE — PLAN OF CARE
Arrived via ambulance from [facility] .  A/o x4. Vss. Right leg outwardly rotated, slight shorter than left. Right pedal pulse strong, cap refill to rle < 3 seconds. Wiggles right toes. Bilateral heel protectors in place.   Chronic duron, at

## 2018-07-09 ENCOUNTER — SURGERY (OUTPATIENT)
Age: 83
End: 2018-07-09

## 2018-07-09 ENCOUNTER — APPOINTMENT (OUTPATIENT)
Dept: GENERAL RADIOLOGY | Facility: HOSPITAL | Age: 83
DRG: 470 | End: 2018-07-09
Attending: ORTHOPAEDIC SURGERY
Payer: MEDICARE

## 2018-07-09 ENCOUNTER — ANESTHESIA (OUTPATIENT)
Dept: SURGERY | Facility: HOSPITAL | Age: 83
DRG: 470 | End: 2018-07-09
Payer: MEDICARE

## 2018-07-09 ENCOUNTER — APPOINTMENT (OUTPATIENT)
Dept: GENERAL RADIOLOGY | Facility: HOSPITAL | Age: 83
DRG: 470 | End: 2018-07-09
Attending: HOSPITALIST
Payer: MEDICARE

## 2018-07-09 LAB
CREAT BLD-MCNC: 0.73 MG/DL (ref 0.7–1.3)
PLATELET # BLD AUTO: 129 10(3)UL (ref 150–450)

## 2018-07-09 PROCEDURE — 73110 X-RAY EXAM OF WRIST: CPT | Performed by: HOSPITALIST

## 2018-07-09 PROCEDURE — 3E0T3BZ INTRODUCTION OF ANESTHETIC AGENT INTO PERIPHERAL NERVES AND PLEXI, PERCUTANEOUS APPROACH: ICD-10-PCS | Performed by: ANESTHESIOLOGY

## 2018-07-09 PROCEDURE — 0SRR0J9 REPLACEMENT OF RIGHT HIP JOINT, FEMORAL SURFACE WITH SYNTHETIC SUBSTITUTE, CEMENTED, OPEN APPROACH: ICD-10-PCS | Performed by: ORTHOPAEDIC SURGERY

## 2018-07-09 PROCEDURE — 73501 X-RAY EXAM HIP UNI 1 VIEW: CPT | Performed by: ORTHOPAEDIC SURGERY

## 2018-07-09 PROCEDURE — 99232 SBSQ HOSP IP/OBS MODERATE 35: CPT | Performed by: HOSPITALIST

## 2018-07-09 DEVICE — SMARTSET GMV HIGH PERFORMANCE GENTAMICIN MEDIUM VISCOSITY BONE CEMENT 40G
Type: IMPLANTABLE DEVICE | Site: HIP | Status: FUNCTIONAL
Brand: SMARTSET

## 2018-07-09 DEVICE — M-SPEC METAL FEMORAL HEAD 12/14 TAPER DIAMETER 28MM +1.5: Type: IMPLANTABLE DEVICE | Site: HIP | Status: FUNCTIONAL

## 2018-07-09 DEVICE — SUMMIT FEMORAL STEM 12/14 TAPER BASIC CEMENTED SIZE 5 121MM
Type: IMPLANTABLE DEVICE | Site: HIP | Status: FUNCTIONAL
Brand: SUMMIT

## 2018-07-09 DEVICE — CEMENTRALIZER STEM CENTRALIZER 9.25MM CEMENTED
Type: IMPLANTABLE DEVICE | Site: HIP | Status: FUNCTIONAL
Brand: CEMENTRALIZER

## 2018-07-09 DEVICE — SELF CENTERING BI-POLAR HEAD 28MM ID 54MM OD
Type: IMPLANTABLE DEVICE | Site: HIP | Status: FUNCTIONAL
Brand: SELF CENTERING

## 2018-07-09 DEVICE — KIT FEM BONE CEMENT RESTRICTOR: Type: IMPLANTABLE DEVICE | Site: HIP | Status: FUNCTIONAL

## 2018-07-09 RX ORDER — CLINDAMYCIN PHOSPHATE 900 MG/50ML
900 INJECTION INTRAVENOUS EVERY 8 HOURS
Status: COMPLETED | OUTPATIENT
Start: 2018-07-09 | End: 2018-07-10

## 2018-07-09 RX ORDER — POLYETHYLENE GLYCOL 3350 17 G/17G
17 POWDER, FOR SOLUTION ORAL DAILY PRN
Status: DISCONTINUED | OUTPATIENT
Start: 2018-07-09 | End: 2018-07-11

## 2018-07-09 RX ORDER — SODIUM CHLORIDE 9 MG/ML
INJECTION, SOLUTION INTRAVENOUS CONTINUOUS
Status: DISCONTINUED | OUTPATIENT
Start: 2018-07-09 | End: 2018-07-11

## 2018-07-09 RX ORDER — MELATONIN
325
Status: DISCONTINUED | OUTPATIENT
Start: 2018-07-10 | End: 2018-07-11

## 2018-07-09 RX ORDER — ONDANSETRON 2 MG/ML
4 INJECTION INTRAMUSCULAR; INTRAVENOUS EVERY 4 HOURS PRN
Status: ACTIVE | OUTPATIENT
Start: 2018-07-09 | End: 2018-07-11

## 2018-07-09 RX ORDER — MORPHINE SULFATE 4 MG/ML
2 INJECTION, SOLUTION INTRAMUSCULAR; INTRAVENOUS EVERY 5 MIN PRN
Status: DISCONTINUED | OUTPATIENT
Start: 2018-07-09 | End: 2018-07-09 | Stop reason: HOSPADM

## 2018-07-09 RX ORDER — ZOLPIDEM TARTRATE 5 MG/1
5 TABLET ORAL NIGHTLY PRN
Status: DISCONTINUED | OUTPATIENT
Start: 2018-07-09 | End: 2018-07-11

## 2018-07-09 RX ORDER — DOCUSATE SODIUM 100 MG/1
100 CAPSULE, LIQUID FILLED ORAL 2 TIMES DAILY
Status: DISCONTINUED | OUTPATIENT
Start: 2018-07-09 | End: 2018-07-11

## 2018-07-09 RX ORDER — MEPERIDINE HYDROCHLORIDE 25 MG/ML
12.5 INJECTION INTRAMUSCULAR; INTRAVENOUS; SUBCUTANEOUS AS NEEDED
Status: DISCONTINUED | OUTPATIENT
Start: 2018-07-09 | End: 2018-07-09 | Stop reason: HOSPADM

## 2018-07-09 RX ORDER — SODIUM CHLORIDE, SODIUM LACTATE, POTASSIUM CHLORIDE, CALCIUM CHLORIDE 600; 310; 30; 20 MG/100ML; MG/100ML; MG/100ML; MG/100ML
INJECTION, SOLUTION INTRAVENOUS CONTINUOUS
Status: DISCONTINUED | OUTPATIENT
Start: 2018-07-09 | End: 2018-07-09 | Stop reason: HOSPADM

## 2018-07-09 RX ORDER — DIPHENHYDRAMINE HCL 25 MG
25 CAPSULE ORAL EVERY 4 HOURS PRN
Status: DISCONTINUED | OUTPATIENT
Start: 2018-07-09 | End: 2018-07-11

## 2018-07-09 RX ORDER — NALOXONE HYDROCHLORIDE 0.4 MG/ML
80 INJECTION, SOLUTION INTRAMUSCULAR; INTRAVENOUS; SUBCUTANEOUS AS NEEDED
Status: DISCONTINUED | OUTPATIENT
Start: 2018-07-09 | End: 2018-07-09 | Stop reason: HOSPADM

## 2018-07-09 RX ORDER — BISACODYL 10 MG
10 SUPPOSITORY, RECTAL RECTAL
Status: DISCONTINUED | OUTPATIENT
Start: 2018-07-09 | End: 2018-07-11

## 2018-07-09 RX ORDER — DIPHENHYDRAMINE HYDROCHLORIDE 50 MG/ML
25 INJECTION INTRAMUSCULAR; INTRAVENOUS ONCE AS NEEDED
Status: ACTIVE | OUTPATIENT
Start: 2018-07-09 | End: 2018-07-09

## 2018-07-09 RX ORDER — METOCLOPRAMIDE HYDROCHLORIDE 5 MG/ML
10 INJECTION INTRAMUSCULAR; INTRAVENOUS EVERY 6 HOURS PRN
Status: ACTIVE | OUTPATIENT
Start: 2018-07-09 | End: 2018-07-11

## 2018-07-09 RX ORDER — SODIUM PHOSPHATE, DIBASIC AND SODIUM PHOSPHATE, MONOBASIC 7; 19 G/133ML; G/133ML
1 ENEMA RECTAL ONCE AS NEEDED
Status: DISCONTINUED | OUTPATIENT
Start: 2018-07-09 | End: 2018-07-11

## 2018-07-09 RX ORDER — ONDANSETRON 2 MG/ML
4 INJECTION INTRAMUSCULAR; INTRAVENOUS AS NEEDED
Status: DISCONTINUED | OUTPATIENT
Start: 2018-07-09 | End: 2018-07-09 | Stop reason: HOSPADM

## 2018-07-09 RX ORDER — DIPHENHYDRAMINE HYDROCHLORIDE 50 MG/ML
12.5 INJECTION INTRAMUSCULAR; INTRAVENOUS EVERY 4 HOURS PRN
Status: DISCONTINUED | OUTPATIENT
Start: 2018-07-09 | End: 2018-07-11

## 2018-07-09 NOTE — PROGRESS NOTES
[facility]  Progress note     Linda Joy Patient Status:  Inpatient    12/15/1931 MRN [id number]   [facility] 3SW-A Attending [address] [address] [place] Day # 1 PCP Timo Friday, DO     Chief Complaint: fall  Subj pb Diego MD

## 2018-07-09 NOTE — PLAN OF CARE
Notified Dr. Pio Fatima of R wrist XR results through the circulating RN in the OR. He will review images. Will monitor.

## 2018-07-09 NOTE — BRIEF OP NOTE
Pre-Operative Diagnosis: Right Femoral neck fracture     Post-Operative Diagnosis: Same      Procedure Performed:   Procedure(s):  RIGHT HEMIARTHOPLASTY HIP       Surgeon(s) and Role:     * Juan José Muhammad MD - Primary    Assistant(s):  PA:  Annamaria Ly

## 2018-07-09 NOTE — ANESTHESIA PREPROCEDURE EVALUATION
PRE-OP EVALUATION    Patient Name: Carlos Dominique    Pre-op Diagnosis: Intertrochanteric fracture of right hip ([facility] [address] Nimisha Quijano    Procedure(s):  RIGHT TOTAL HIP REPLACEMENT  ------------------------  [place]   AFTER 1 PM.    Surgeon(s) and Role:     * tablet Rfl: 0   ALENDRONATE SODIUM 70 MG Oral Tab TAKE 1 TABLET BY MOUTH EVERY SATURDAY ON AN EMPTY STOMACH Disp: 12 tablet Rfl: 0   FLUTICASONE PROPIONATE 50 MCG/ACT Nasal Suspension SHAKE LIQUID AND USE 1 SPRAY IN EACH NOSTRIL DAILY Disp: 1 Bottle Rfl: 0 ENDOSCOPY  No date: REPAIR ING HERNIA,5+Y/O,REDUCIBL     Smoking status: Never Smoker    Smokeless tobacco: Never Used    Alcohol use Yes  0.0 oz/week     Comment: 1-2 DRINKS PER DAY of scotch       Drug use: No     Available pre-op labs reviewed.     Lab R

## 2018-07-09 NOTE — CONSULTS
[facility]  Report of Consultation    Delicia Santiago Patient Status:  Inpatient    12/15/1931 MRN [id number]   [facility] 3SW-A Attending Karen Cruz [facility] Day # 0 PCP Baldev Leak, DO     Reason for Consultation: ENDOSCOPY  No date: REPAIR ING HERNIA,5+Y/O,REDUCIBL  Family History   Problem Relation Age of Onset   • Cancer Father    • Stroke Mother    • MS [OTHER] Daughter       reports that he has never smoked.  He has never used smokeless tobacco. He repor pressure 154/77, pulse 90, temperature 98.5 °F (36.9 °C), temperature source Oral, resp. rate 18, SpO2 95 %. HEENT: Exam is unremarkable. Normocephalic, atraumatic. Neck: No tenderness to palpitation. No JVD. Supple.    Extremities:  Right LE shorten to urinary indwelling catheter ([address]     Essential hypertension     Narcotic abuse, continuous (HCC)     Humeral head fracture     Rotator cuff tear arthropathy     Acute renal failure (ARF) ([address]     Alcohol withdrawal ([address]     At risk for falling     Hy

## 2018-07-09 NOTE — PLAN OF CARE
Pt returned from PACU on bed. VSS. Very confused. Does not know that he is in the hospital or that he had surgery. Asking to leave and being agitated when reorientation is attempted. Spoke with dgtr on the phone, am willing to stay for now.  Denies pain or

## 2018-07-09 NOTE — PLAN OF CARE
Pt reports that he is due to have his catheter changed today and was supposed to go to [facility] urology office to do so. This RN notified [facility] urology that he will not make the appointment as he is in the hospital at the pt's request.  Pt also C/O R wrist pain.

## 2018-07-09 NOTE — ANESTHESIA POSTPROCEDURE EVALUATION
[facility] Patient Status:  Inpatient   Age/Gender 80year old male MRN [id number]   [facility] SURGERY Attending [address]ust[address] [place] Day # 1 PCP Mikhail Melton DO       Anesthesia Post-op Note    Proc

## 2018-07-09 NOTE — PHYSICAL THERAPY NOTE
Patient is scheduled for surgery today for Right femoral neck Fx. Will d/c PT evaluation this time. Please reorder PT with appropriate weight bearing status post surgical. THERON Xavier was notified.

## 2018-07-10 PROCEDURE — 99232 SBSQ HOSP IP/OBS MODERATE 35: CPT | Performed by: HOSPITALIST

## 2018-07-10 NOTE — PLAN OF CARE
PAIN - ADULT    • Verbalizes/displays adequate comfort level or patient's stated pain goal Progressing        Patient/Family Goals    • Patient/Family Short Term Goal Progressing        Sitting up in chair earlier this morning.   Right hip dressing clean, d

## 2018-07-10 NOTE — PLAN OF CARE
Has declined multiple times to have duron catheter changed. Instructed on rationale for change, declines,  States \"Not today, wait until tomorrow\". Temp of 100.8. Reinforced incentive spirometer 10 times every hour while awake. Medication given.   Al

## 2018-07-10 NOTE — PROGRESS NOTES
[address] [facility]  Orthopedic Surgery  Progress Note    Mihir Hernandez Patient Status:  Inpatient    12/15/1931 MRN [id number]   [facility] 3SW-A Attending [facility] Day # 2 PCP DO CHAVEZ Bennett hours.    DIAGNOSTICS:       ASSESSMENT/PLAN:  1. Continue pain management  2. Continue DVT prophylaxis   3. PT/OT  4. Discharge planning: [facility] when cleared  5. Continue medical management  6.  Follow up with Azar Aguilar MD in 2 weeks      Orion Hendrickson

## 2018-07-10 NOTE — PHYSICAL THERAPY NOTE
PHYSICAL THERAPY EVALUATION - INPATIENT     Room Number: 361/361-A  Evaluation Date: 7/10/2018  Type of Evaluation: Initial  Physician Order: PT Eval and Treat    Presenting Problem: s/p fall with R hip fracture; s/p R hemiarthroplasty; R wrist fx prostatectomy  No date: OTHER SURGICAL HISTORY      Comment: bladder surgery  1/1/95: OTHER SURGICAL HISTORY      Comment: testis removed  6/7/13: OTHER SURGICAL HISTORY      Comment: Cysto-Dr. Emperatriz Peñaloza  8/20/14: OTHER SURGICAL HISTORY      Comment: Spinal limits except for the following:  R knee extension 2/5    BALANCE  Static Sitting: Fair +  Dynamic Sitting: Fair  Static Standing: Poor  Dynamic Standing: Poor -    ADDITIONAL TESTS                                    NEUROLOGICAL FINDINGS max A at times due to knee buckling and inability to follow instructions of therapist. Pt left up in chair and spoke to RN and PCT about placing an alarm on pt in chair. (Unable to find chair pad in supply room).  Pt instructed in anterior hip precautions a GOALS    Goal #1 Patient is able to demonstrate supine - sit EOB @ level: supervision     Goal #2 Patient is able to demonstrate transfers Sit to/from Stand at assistance level: supervision     Goal #3 Patient is able to ambulate 50 feet with assist device

## 2018-07-10 NOTE — OPERATIVE REPORT
[facility]    PATIENT'S NAME: Durga Horn   ATTENDING PHYSICIAN: Shun García D.O.   OPERATING PHYSICIAN: Tico Perea M.D.    PATIENT ACCOUNT#:   [de-identified]    LOCATION:  [address] [place] [address]  MEDICAL RECORD #:   [id number]       DATE OF BIR tendon was subperiosteally elevated off the anterior aspect of the greater trochanter down to capsule. The capsule was incised, producing hematoma.   After adequate exposure, the leg was placed into the leg bag, and a saw was used to resect the remaining f wound and allowed to sit for 1 minute before evacuating the solution. TXA was then placed in the wound and allowed to sit for at least 3 minutes as closure was performed.   A #2 FiberWire suture was used in a figure-of-eight fashion to close the gluteus me

## 2018-07-10 NOTE — PROGRESS NOTES
[facility]  Progress note     Cherelle Funes Patient Status:  Inpatient    12/15/1931 MRN [id number]   [facility] 3SW-A Attending Yesenia [facility] Day # 2 PCP Marisol Wisdom DO     Chief Complaint: fall    Carrillo

## 2018-07-10 NOTE — OCCUPATIONAL THERAPY NOTE
OCCUPATIONAL THERAPY EVALUATION - INPATIENT     Room Number: 361/361-A  Evaluation Date: 7/10/2018  Type of Evaluation: Initial  Presenting Problem:  (R hip hemiarthroplasty and R radius fx s/p fall)    Physician Order: IP Consult to Occupational Therapy removed  6/7/13: OTHER SURGICAL HISTORY      Comment: Radhames Peñaloza  8/20/14: OTHER SURGICAL HISTORY      Comment: Spinal cord stimulator placed  9/17/2014: OTHER SURGICAL HISTORY      Comment: Procedure: ESOPHAGOGASTRODUODENOSCOPY (EGD); made  Awareness of Deficits:  decreased awareness of deficits  Problem Solving:  assistance required to identify errors made, assistance required to generate solutions and assistance required to implement solutions    VISION  Current Vision: wears glasses sit in the chair. Patient had two instances of R knee buckling but with cues was able to straighten up. He appeared to have poor working memory, with little carryover for precautions.  Patient not oriented to time or place, he was educated to use call light Profile/Medical History LOW - Brief history including review of medical or therapy records    Specific performance deficits impacting engagement in ADL/IADL MODERATE  3 - 5 performance deficits   Client Assessment/Performance Deficits MODERATE - Comorbidit

## 2018-07-10 NOTE — PLAN OF CARE
Patients duron needs to be changed today ,patient want to sleep now ,will try later wants to be medicated for pain prior to insertion ,endorsed to day RN .

## 2018-07-10 NOTE — CM/SW NOTE
07/10/18 1500   CM/SW Referral Data   Referral Source Physician   Reason for Referral Discharge planning   Informant Patient; Children;Edward Staff   Pertinent Medical Hx   Primary Care Physician Name [address] Camrelita Pichardo, DO   Patient Info   Patient's Mental Stat friend, Orion Raphael, may have some preferences about [facility]. FLETCHER informed brother of Franck Gallagher and that list was left in pt's room. He will ask if Orion Raphael can  the list.  He will f/u with FLETCHER once he obtains info from son, Dany Alcazar, and friend, Orion Raphael.     DON scr

## 2018-07-11 VITALS
SYSTOLIC BLOOD PRESSURE: 117 MMHG | OXYGEN SATURATION: 100 % | WEIGHT: 146.13 LBS | HEART RATE: 76 BPM | DIASTOLIC BLOOD PRESSURE: 62 MMHG | BODY MASS INDEX: 24 KG/M2 | RESPIRATION RATE: 26 BRPM | TEMPERATURE: 98 F

## 2018-07-11 PROCEDURE — 99238 HOSP IP/OBS DSCHRG MGMT 30/<: CPT | Performed by: HOSPITALIST

## 2018-07-11 RX ORDER — ACETAMINOPHEN AND CODEINE PHOSPHATE 300; 30 MG/1; MG/1
1 TABLET ORAL EVERY 4 HOURS PRN
Qty: 20 TABLET | Refills: 0 | Status: SHIPPED | OUTPATIENT
Start: 2018-07-11 | End: 2018-07-17 | Stop reason: ALTCHOICE

## 2018-07-11 RX ORDER — HYDROCODONE BITARTRATE AND ACETAMINOPHEN 5; 325 MG/1; MG/1
1 TABLET ORAL EVERY 4 HOURS PRN
Qty: 20 TABLET | Refills: 0 | Status: ON HOLD | OUTPATIENT
Start: 2018-07-11 | End: 2021-03-04

## 2018-07-11 RX ORDER — ALPRAZOLAM 0.5 MG/1
0.5 TABLET ORAL 2 TIMES DAILY PRN
Qty: 30 TABLET | Refills: 0 | Status: SHIPPED | OUTPATIENT
Start: 2018-07-11 | End: 2018-08-07

## 2018-07-11 RX ORDER — PSEUDOEPHEDRINE HCL 30 MG
100 TABLET ORAL 2 TIMES DAILY PRN
Qty: 40 CAPSULE | Refills: 0 | Status: SHIPPED | OUTPATIENT
Start: 2018-07-11 | End: 2018-08-16

## 2018-07-11 RX ORDER — MELATONIN
325
Qty: 30 TABLET | Refills: 2 | Status: ON HOLD | OUTPATIENT
Start: 2018-07-12 | End: 2018-08-08

## 2018-07-11 RX ORDER — POLYETHYLENE GLYCOL 3350 17 G/17G
17 POWDER, FOR SOLUTION ORAL DAILY
Status: DISCONTINUED | OUTPATIENT
Start: 2018-07-11 | End: 2018-07-11

## 2018-07-11 NOTE — PROGRESS NOTES
[address] [facility]  Orthopedic Surgery  Progress Note    Viktoria Solares Patient Status:  Inpatient    12/15/1931 MRN [id number]   [facility] 3SW-A Attending Dylon [address] [address] [place] Day # 3 PCP DO CHAVEZ Beverly

## 2018-07-11 NOTE — CM/SW NOTE
07/11/18 1626   Discharge disposition   Expected discharge disposition Skilled Nurs   Name of Thony Parra   Discharge transportation [employer]

## 2018-07-11 NOTE — OCCUPATIONAL THERAPY NOTE
OCCUPATIONAL THERAPY TREATMENT NOTE - INPATIENT     Room Number: 361/361-A  Session:  1/5  Number of Visits to Meet Established Goals: 5    Presenting Problem:  (R hip hemiarthroplasty and R radius fx s/p fall)    History related to current admission:    Ad Kiesha Cavazos  8/20/14: OTHER SURGICAL HISTORY      Comment: Spinal cord stimulator placed  9/17/2014: OTHER SURGICAL HISTORY      Comment: Procedure: ESOPHAGOGASTRODUODENOSCOPY (EGD);                  Surgeon: Alonso Simpson MD;  Location: [address] [place] [address] with cues to position self to improve balance. OT reminded patient that he will feel pain in RLE when walking. Sit to stand completed with max A of two and narrow EDWARD.  Patient demonstrated decreased awareness of deficits, anxiety, and decreased sequencin Goals  Patient will transfer from sit to supine:  with supervision  Patient will transfer from supine to sit:  with supervision  Patient will transfer to toilet:  with min assist     UE Exercise Program Goal  Patient will be supervision with bilateral AROM

## 2018-07-11 NOTE — PLAN OF CARE
Impaired Activities of Daily Living    • Achieve highest/safest level of independence in self care Progressing        MUSCULOSKELETAL - ADULT    • Return mobility to safest level of function Progressing        PAIN - ADULT    • Verbalizes/displays adequate

## 2018-07-11 NOTE — PROGRESS NOTES
[facility] HOSPITALIST  Progress note     Omid Greer Patient Status:  Inpatient    12/15/1931 MRN [id number]   [facility] 3SW-A Attending Chana Streeter [facility] Day # 3 PCP Aureliano Webb DO     Chief Complaint: fall    [place]

## 2018-07-11 NOTE — CM/SW NOTE
Pt accepted to [facility] [phone] for today to a private room. SW spoke with pt earlier about this and he has no issues about rehab there. Informed by RN that pt can likely d/c today and will need ambulance transport.   Indio Pompa

## 2018-07-11 NOTE — CM/SW NOTE
Call from pt's brother, Carmita Carrizales. They would like for pt to go to [facility] [phone] for rehab. Discussed Medicare [facility]AH/SNF benefit but that pt would likely only need less than 2 wks in rehab before returning    Referral sent via [facility].

## 2018-07-11 NOTE — PHYSICAL THERAPY NOTE
PHYSICAL THERAPY TREATMENT NOTE - INPATIENT    Room Number: 361/361-A     Session: 1   Number of Visits to Meet Established Goals: 5    Presenting Problem: s/p Fall with Right hip fracture; S/p Right hemiarthroplasty;  Right wrist fx   History related to c HISTORY      Comment: bladder surgery  1/1/95: OTHER SURGICAL HISTORY      Comment: testis removed  6/7/13: OTHER SURGICAL HISTORY      Comment: Cysto-Dr. Vern Cline  8/20/14: OTHER SURGICAL HISTORY      Comment: Spinal cord stimulator placed  9/17/2014: Monik Zhang Lot   -   Need to walk in hospital room?: A Lot   -   Climbing 3-5 steps with a railing?: Total       AM-PAC Score:  Raw Score: 12   PT Approx Degree of Impairment Score: 68.66%   Standardized Score (AM-PAC Scale): 35.33   CMS Modifier (G-Code): CL    FUNC impairments decreased activity tolerance, decreased static and dynamic sitting and standing balance. Patient is functioning below baseline & will benefit by continued IP PT to maximize functional mobility.  Recommend Sub Acute Rehab @ d/c  DISCHARGE RECOMME

## 2018-07-12 NOTE — DISCHARGE SUMMARY
[facility] HOSPITALIST  DISCHARGE SUMMARY     Gustavo Carey Patient Status:  Inpatient    12/15/1931 MRN [id number]   [facility] 3SW-A Attending No att. providers found   Hosp Day # 3 PCP Austin Noel DO     Date of Admission: 2018 Quantity:  40 capsule  Refills:  0     ferrous sulfate 325 (65 FE) MG Tbec      Take 1 tablet (325 mg total) by mouth daily with breakfast.   Quantity:  30 tablet  Refills:  2     HYDROcodone-acetaminophen 5-325 MG Tabs  Commonly known as:  NORCO      Take 0     Fexofenadine HCl 180 MG Tabs  Commonly known as:  ALLEGRA       Refills:  2     Fluticasone Propionate 50 MCG/ACT Susp  Commonly known as:  FLONASE      SHAKE LIQUID AND USE 1 SPRAY IN EACH NOSTRIL DAILY   Quantity:  1 Bottle  Refills:  0     hydroco week      Erica Meraz MD  [id number] [id number] [phone]    In 2 weeks      Stafford Close, DO  [address] [address]  [phone]    In 1 week        Vital signs:  Temp:  [97.9 °F (36.6 °

## 2018-07-13 PROBLEM — Z47.89 ORTHOPEDIC AFTERCARE: Status: ACTIVE | Noted: 2018-07-13

## 2018-07-17 ENCOUNTER — SNF VISIT (OUTPATIENT)
Dept: INTERNAL MEDICINE CLINIC | Age: 83
End: 2018-07-17

## 2018-07-17 VITALS
TEMPERATURE: 97 F | WEIGHT: 150.19 LBS | RESPIRATION RATE: 18 BRPM | OXYGEN SATURATION: 97 % | HEART RATE: 90 BPM | BODY MASS INDEX: 24 KG/M2 | DIASTOLIC BLOOD PRESSURE: 70 MMHG | SYSTOLIC BLOOD PRESSURE: 120 MMHG

## 2018-07-17 DIAGNOSIS — F32.0 CURRENT MILD EPISODE OF MAJOR DEPRESSIVE DISORDER, UNSPECIFIED WHETHER RECURRENT (HCC): ICD-10-CM

## 2018-07-17 DIAGNOSIS — K21.9 GASTROESOPHAGEAL REFLUX DISEASE, ESOPHAGITIS PRESENCE NOT SPECIFIED: ICD-10-CM

## 2018-07-17 DIAGNOSIS — Z47.89 ORTHOPEDIC AFTERCARE: Primary | ICD-10-CM

## 2018-07-17 DIAGNOSIS — F10.10 ALCOHOL ABUSE: ICD-10-CM

## 2018-07-17 DIAGNOSIS — D69.6 THROMBOCYTOPENIA (HCC): ICD-10-CM

## 2018-07-17 DIAGNOSIS — R53.1 WEAKNESS: ICD-10-CM

## 2018-07-17 DIAGNOSIS — R26.9 GAIT ABNORMALITY: ICD-10-CM

## 2018-07-17 DIAGNOSIS — I10 ESSENTIAL HYPERTENSION: ICD-10-CM

## 2018-07-17 PROCEDURE — 99310 SBSQ NF CARE HIGH MDM 45: CPT | Performed by: NURSE PRACTITIONER

## 2018-07-17 RX ORDER — B-COMPLEX WITH VITAMIN C
1 TABLET ORAL DAILY
COMMUNITY
End: 2018-08-16

## 2018-07-17 NOTE — PROGRESS NOTES
Zaida El  : 12/15/1931  Age 80year old  male patient is admitted to Facility: [address] [facility] [address] for [facility] after right intertrochanteric femur fx and subsequent hemiarthroplasty.     [facility] Admit date:    18  Discharge date to [facility]: 1980S   • Personal history of contact with and (suspected) exposure to asbestos    • Unspecified essential hypertension    • Visual impairment     glasses     Past Surgical History:  6/3/11: BACK SURGERY      Comment: L4-5 decompression  No date: Viva Shi tablet Rfl: 2   docusate sodium 100 MG Oral Cap Take 100 mg by mouth 2 (two) times daily as needed for constipation. Disp: 40 capsule Rfl: 0   rivaroxaban 10 MG Oral Tab Take 1 tablet (10 mg total) by mouth nightly.  Disp: 28 tablet Rfl: 0   BICALUTAMIDE 50 heartburn  :no dysuria, urgency or frequency; no epididymal or testicular pain; no penile discharge; no hernias; no nocturia: no hematuria  MUSCULOSKELETAL:no joint complaints upper or lower extremities  NEURO:no sensory or motor complaint, denies seizur AT THIS VISIT:  7.13.18    WBC  6.81  Hgb  9.9  Hct  30.3    Glucose  86  BUN  12  Cr  0.75  GFR  >60  Na  143  K  3.7    [employer], notes, lab and imaging results reviewed. Medication reconciliation completed.         SEE PLAN BELOW  Fall/rig

## 2018-07-19 ENCOUNTER — SNF VISIT (OUTPATIENT)
Dept: INTERNAL MEDICINE CLINIC | Age: 83
End: 2018-07-19

## 2018-07-19 VITALS — OXYGEN SATURATION: 97 % | BODY MASS INDEX: 24 KG/M2 | WEIGHT: 146.81 LBS | HEART RATE: 68 BPM

## 2018-07-19 DIAGNOSIS — R51.9 HEADACHE IN FRONT OF HEAD: ICD-10-CM

## 2018-07-19 DIAGNOSIS — R53.1 WEAKNESS: ICD-10-CM

## 2018-07-19 DIAGNOSIS — Z47.89 ORTHOPEDIC AFTERCARE: Primary | ICD-10-CM

## 2018-07-19 PROCEDURE — 99308 SBSQ NF CARE LOW MDM 20: CPT | Performed by: NURSE PRACTITIONER

## 2018-07-19 RX ORDER — BUTALBITAL, ACETAMINOPHEN AND CAFFEINE 50; 325; 40 MG/1; MG/1; MG/1
1 TABLET ORAL EVERY 4 HOURS PRN
COMMUNITY
End: 2018-08-07

## 2018-07-19 NOTE — PROGRESS NOTES
Boaz Nair, 12/15/1931, 80year old, male    Chief Complaint:  Patient presents with:   Follow - Up: s/p right intertrochanteric femur fx and subsequent hemiarthroplasty  Headache       Subjective:   PMH significant for Depression, HTN, HL, GERD, hx cranial nerves intact II-XII, follows commands  PSYCHIATRIC: alert and oriented x 3; affect appropriate; forgetful/talkative/likes to tell stories       Medications reviewed: Yes    Diagnostics reviewed:   No new results available for review.     Assessment

## 2018-07-20 RX ORDER — LISINOPRIL 5 MG/1
TABLET ORAL
Qty: 90 TABLET | Refills: 0 | OUTPATIENT
Start: 2018-07-20

## 2018-07-24 ENCOUNTER — SNF VISIT (OUTPATIENT)
Dept: INTERNAL MEDICINE CLINIC | Age: 83
End: 2018-07-24

## 2018-07-24 VITALS
RESPIRATION RATE: 18 BRPM | BODY MASS INDEX: 23 KG/M2 | SYSTOLIC BLOOD PRESSURE: 132 MMHG | OXYGEN SATURATION: 97 % | DIASTOLIC BLOOD PRESSURE: 74 MMHG | WEIGHT: 142.63 LBS | TEMPERATURE: 98 F | HEART RATE: 72 BPM

## 2018-07-24 DIAGNOSIS — Z47.89 ORTHOPEDIC AFTERCARE: Primary | ICD-10-CM

## 2018-07-24 DIAGNOSIS — D69.6 THROMBOCYTOPENIA (HCC): ICD-10-CM

## 2018-07-24 PROCEDURE — 99308 SBSQ NF CARE LOW MDM 20: CPT | Performed by: NURSE PRACTITIONER

## 2018-07-24 NOTE — PROGRESS NOTES
Linda Gargpretty, 12/15/1931, 80year old, male    Chief Complaint:  Patient presents with:   Follow - Up: s/p right intertrochanteric femur fx and subsequent hemiarthroplasty       Subjective:   PMH significant for Depression, HTN, HL, GERD, hx of C Diff in place. CMS intact.     NEUROLOGIC: intact; no sensorimotor deficit, cranial nerves intact II-XII, follows commands  PSYCHIATRIC: alert and oriented x 3; affect appropriate; forgetful/talkative/likes to tell stories       Medications reviewed: Yes    Daphney withdrawal  2.  May not have ETOH    NEAL Del Cid  7/24/2018  11:45 AM

## 2018-07-26 ENCOUNTER — SNF DISCHARGE (OUTPATIENT)
Dept: INTERNAL MEDICINE CLINIC | Age: 83
End: 2018-07-26

## 2018-07-26 VITALS — OXYGEN SATURATION: 99 % | HEART RATE: 66 BPM

## 2018-07-26 DIAGNOSIS — K21.9 GASTROESOPHAGEAL REFLUX DISEASE, ESOPHAGITIS PRESENCE NOT SPECIFIED: ICD-10-CM

## 2018-07-26 DIAGNOSIS — Z47.89 ORTHOPEDIC AFTERCARE: Primary | ICD-10-CM

## 2018-07-26 DIAGNOSIS — I10 ESSENTIAL HYPERTENSION: ICD-10-CM

## 2018-07-26 DIAGNOSIS — F10.10 ALCOHOL ABUSE: ICD-10-CM

## 2018-07-26 DIAGNOSIS — D69.6 THROMBOCYTOPENIA (HCC): ICD-10-CM

## 2018-07-26 DIAGNOSIS — R26.9 GAIT ABNORMALITY: ICD-10-CM

## 2018-07-26 DIAGNOSIS — F32.0 CURRENT MILD EPISODE OF MAJOR DEPRESSIVE DISORDER, UNSPECIFIED WHETHER RECURRENT (HCC): ICD-10-CM

## 2018-07-26 DIAGNOSIS — R51.9 HEADACHE IN FRONT OF HEAD: ICD-10-CM

## 2018-07-26 DIAGNOSIS — R53.1 WEAKNESS: ICD-10-CM

## 2018-07-26 PROCEDURE — 99316 NF DSCHRG MGMT 30 MIN+: CPT | Performed by: NURSE PRACTITIONER

## 2018-07-26 NOTE — PROGRESS NOTES
Daniel Eve, 12/15/1931, 80year old, male is being discharged from Facility: [address]    Date of Admission:  7.11.18    Date of Discharge:  Anticipated on 7.31.18                                 Admitting Diagnoses catheter--chronic  SKIN: no rashes, no suspicious lesions, pale, warm, dry  WOUND:   Right hip incision:  Staples intact, edges well approximated, no erythema/edema/drainage  EYES: PERRLA, EOMI, sclera anicteric, conjunctiva normal; there is no nystagmus, eval and tx  10. Xarelto 10 mg q HS until 8.8.18 for DVT prophylaxis  11. Daily incision care and dry drsg change  12. Monitor for fever/sxs of infection  13.  Norco 5/325 mg q 4 hrs prn plus 1 1/2 tabs q AM and 1 tab at HS  14. F/U w/ Dr Dao Fatima in

## 2018-07-30 RX ORDER — FLUTICASONE PROPIONATE 50 MCG
SPRAY, SUSPENSION (ML) NASAL
Qty: 3 BOTTLE | Refills: 0 | Status: SHIPPED | OUTPATIENT
Start: 2018-07-30 | End: 2018-08-16

## 2018-08-01 ENCOUNTER — PATIENT OUTREACH (OUTPATIENT)
Dept: CASE MANAGEMENT | Age: 83
End: 2018-08-01

## 2018-08-01 DIAGNOSIS — S22.41XA CLOSED FRACTURE OF MULTIPLE RIBS OF RIGHT SIDE, INITIAL ENCOUNTER: ICD-10-CM

## 2018-08-01 DIAGNOSIS — Z96.649 S/P HIP HEMIARTHROPLASTY: ICD-10-CM

## 2018-08-01 NOTE — PROGRESS NOTES
Initial Post Discharge Follow Up   Discharge Date: 7/31/18 Patient was discharged from [place] [address] Date: 8/1/2018    Consent Verification:  Assessment Completed With: Patient  HIPAA Verified?   Yes    Discharge Dx:    Right hemiarthropla comes in and helps him. • Were you given a specific diet to follow at discharge?   no , Patient has someone brings his meals to his room.     Medications:     Current Outpatient Prescriptions:  FLUTICASONE PROPIONATE 50 MCG/ACT Nasal Suspension SHAKE LIQUI Rfl:    loratadine 10 MG Oral Tab Take 10 mg by mouth daily. Disp:  Rfl:    hydrocortisone 1 % External Ointment Apply 1 Application topically 2 (two) times daily. Disp: 56 g Rfl: 0   Saw Palmetto 1000 MG Oral Cap Take by mouth.  Disp:  Rfl:      • When yo instructed patient to call PCP with any questions or needs, he states he will. BOOK BY DATE: 8/14/18    [x]  Discharge Summary, Discharge medications reviewed/discussed/and reconciled with patient, and orders reviewed and discussed.  Any changes or upda

## 2018-08-03 NOTE — PROGRESS NOTES
8/3/18 10:51 am ALBA recieved a call from [place] at [address] [facility] who stated that [facility] for [facility].

## 2018-08-07 ENCOUNTER — OFFICE VISIT (OUTPATIENT)
Dept: FAMILY MEDICINE CLINIC | Facility: CLINIC | Age: 83
End: 2018-08-07
Payer: MEDICARE

## 2018-08-07 VITALS
TEMPERATURE: 98 F | HEART RATE: 76 BPM | BODY MASS INDEX: 22.18 KG/M2 | WEIGHT: 138 LBS | SYSTOLIC BLOOD PRESSURE: 104 MMHG | RESPIRATION RATE: 16 BRPM | HEIGHT: 66 IN | OXYGEN SATURATION: 98 % | DIASTOLIC BLOOD PRESSURE: 74 MMHG

## 2018-08-07 DIAGNOSIS — S72.002A CLOSED FRACTURE OF LEFT HIP, INITIAL ENCOUNTER (HCC): Primary | ICD-10-CM

## 2018-08-07 PROCEDURE — 99496 TRANSJ CARE MGMT HIGH F2F 7D: CPT | Performed by: FAMILY MEDICINE

## 2018-08-07 RX ORDER — ALPRAZOLAM 0.25 MG/1
0.25 TABLET ORAL 2 TIMES DAILY PRN
Qty: 30 TABLET | Refills: 0 | Status: SHIPPED | OUTPATIENT
Start: 2018-08-07 | End: 2018-11-23

## 2018-08-07 RX ORDER — ALENDRONATE SODIUM 70 MG/1
TABLET ORAL
Qty: 12 TABLET | Refills: 0 | OUTPATIENT
Start: 2018-08-07

## 2018-08-07 NOTE — PROGRESS NOTES
Here for post hospital evaluation. Recently and accidentally fell over his dog's leash falling onto the right side and breaking his right hip.   He was hospitalized and had the repair successfully done he has completed his physical therapy and is back to a

## 2018-08-08 ENCOUNTER — APPOINTMENT (OUTPATIENT)
Dept: GENERAL RADIOLOGY | Facility: HOSPITAL | Age: 83
End: 2018-08-08
Attending: EMERGENCY MEDICINE
Payer: MEDICARE

## 2018-08-08 ENCOUNTER — HOSPITAL ENCOUNTER (OUTPATIENT)
Facility: HOSPITAL | Age: 83
Setting detail: OBSERVATION
LOS: 1 days | Discharge: HOME | End: 2018-08-10
Attending: EMERGENCY MEDICINE | Admitting: HOSPITALIST
Payer: MEDICARE

## 2018-08-08 ENCOUNTER — APPOINTMENT (OUTPATIENT)
Dept: CT IMAGING | Facility: HOSPITAL | Age: 83
End: 2018-08-08
Attending: EMERGENCY MEDICINE
Payer: MEDICARE

## 2018-08-08 DIAGNOSIS — R53.1 WEAKNESS: Primary | ICD-10-CM

## 2018-08-08 DIAGNOSIS — N39.0 URINARY TRACT INFECTION WITHOUT HEMATURIA, SITE UNSPECIFIED: ICD-10-CM

## 2018-08-08 PROBLEM — D64.9 ANEMIA: Status: ACTIVE | Noted: 2018-08-08

## 2018-08-08 PROBLEM — E16.2 HYPOGLYCEMIA: Status: ACTIVE | Noted: 2018-08-08

## 2018-08-08 PROBLEM — E87.20 METABOLIC ACIDOSIS: Status: ACTIVE | Noted: 2018-08-08

## 2018-08-08 PROBLEM — E87.2 METABOLIC ACIDOSIS: Status: ACTIVE | Noted: 2018-08-08

## 2018-08-08 LAB
ALBUMIN SERPL-MCNC: 3.5 G/DL (ref 3.5–4.8)
ALBUMIN/GLOB SERPL: 1 {RATIO} (ref 1–2)
ALP LIVER SERPL-CCNC: 62 U/L (ref 45–117)
ALT SERPL-CCNC: 11 U/L (ref 17–63)
ANION GAP SERPL CALC-SCNC: 13 MMOL/L (ref 0–18)
AST SERPL-CCNC: 32 U/L (ref 15–41)
ATRIAL RATE: 75 BPM
BASOPHILS # BLD AUTO: 0.03 X10(3) UL (ref 0–0.1)
BASOPHILS NFR BLD AUTO: 0.5 %
BILIRUB SERPL-MCNC: 0.7 MG/DL (ref 0.1–2)
BILIRUB UR QL STRIP.AUTO: NEGATIVE
BUN BLD-MCNC: 10 MG/DL (ref 8–20)
BUN/CREAT SERPL: 12.7 (ref 10–20)
CALCIUM BLD-MCNC: 8.9 MG/DL (ref 8.3–10.3)
CHLORIDE SERPL-SCNC: 105 MMOL/L (ref 101–111)
CO2 SERPL-SCNC: 20 MMOL/L (ref 22–32)
COLOR UR AUTO: YELLOW
CREAT BLD-MCNC: 0.79 MG/DL (ref 0.7–1.3)
EOSINOPHIL # BLD AUTO: 0.1 X10(3) UL (ref 0–0.3)
EOSINOPHIL NFR BLD AUTO: 1.6 %
ERYTHROCYTE [DISTWIDTH] IN BLOOD BY AUTOMATED COUNT: 14.1 % (ref 11.5–16)
GLOBULIN PLAS-MCNC: 3.5 G/DL (ref 2.5–3.7)
GLUCOSE BLD-MCNC: 105 MG/DL (ref 65–99)
GLUCOSE BLD-MCNC: 66 MG/DL (ref 70–99)
GLUCOSE UR STRIP.AUTO-MCNC: NEGATIVE MG/DL
HCT VFR BLD AUTO: 34.6 % (ref 37–53)
HGB BLD-MCNC: 11.2 G/DL (ref 13–17)
IMMATURE GRANULOCYTE COUNT: 0.06 X10(3) UL (ref 0–1)
IMMATURE GRANULOCYTE RATIO %: 0.9 %
KETONES UR STRIP.AUTO-MCNC: NEGATIVE MG/DL
LYMPHOCYTES # BLD AUTO: 0.77 X10(3) UL (ref 0.9–4)
LYMPHOCYTES NFR BLD AUTO: 12.1 %
M PROTEIN MFR SERPL ELPH: 7 G/DL (ref 6.1–8.3)
MCH RBC QN AUTO: 30.7 PG (ref 27–33.2)
MCHC RBC AUTO-ENTMCNC: 32.4 G/DL (ref 31–37)
MCV RBC AUTO: 94.8 FL (ref 80–99)
MONOCYTES # BLD AUTO: 0.27 X10(3) UL (ref 0.1–1)
MONOCYTES NFR BLD AUTO: 4.2 %
NEUTROPHIL ABS PRELIM: 5.16 X10 (3) UL (ref 1.3–6.7)
NEUTROPHILS # BLD AUTO: 5.16 X10(3) UL (ref 1.3–6.7)
NEUTROPHILS NFR BLD AUTO: 80.7 %
NITRITE UR QL STRIP.AUTO: POSITIVE
OSMOLALITY SERPL CALC.SUM OF ELEC: 283 MOSM/KG (ref 275–295)
P AXIS: 44 DEGREES
P-R INTERVAL: 160 MS
PH UR STRIP.AUTO: 5.5 [PH] (ref 4.5–8)
PLATELET # BLD AUTO: 188 10(3)UL (ref 150–450)
POTASSIUM SERPL-SCNC: 4.7 MMOL/L (ref 3.6–5.1)
PROT UR STRIP.AUTO-MCNC: NEGATIVE MG/DL
Q-T INTERVAL: 400 MS
QRS DURATION: 96 MS
QTC CALCULATION (BEZET): 446 MS
R AXIS: -67 DEGREES
RBC # BLD AUTO: 3.65 X10(6)UL (ref 3.8–5.8)
RED CELL DISTRIBUTION WIDTH-SD: 48.8 FL (ref 35.1–46.3)
SODIUM SERPL-SCNC: 138 MMOL/L (ref 136–144)
SP GR UR STRIP.AUTO: 1.01 (ref 1–1.03)
T AXIS: 67 DEGREES
TROPONIN I SERPL-MCNC: <0.046 NG/ML (ref ?–0.05)
UROBILINOGEN UR STRIP.AUTO-MCNC: 0.2 MG/DL
VENTRICULAR RATE: 75 BPM
WBC # BLD AUTO: 6.4 X10(3) UL (ref 4–13)

## 2018-08-08 PROCEDURE — 99220 INITIAL OBSERVATION CARE,LEVL III: CPT | Performed by: HOSPITALIST

## 2018-08-08 PROCEDURE — 71046 X-RAY EXAM CHEST 2 VIEWS: CPT | Performed by: EMERGENCY MEDICINE

## 2018-08-08 PROCEDURE — 70450 CT HEAD/BRAIN W/O DYE: CPT | Performed by: EMERGENCY MEDICINE

## 2018-08-08 RX ORDER — ALPRAZOLAM 0.25 MG/1
0.25 TABLET ORAL 2 TIMES DAILY PRN
Status: DISCONTINUED | OUTPATIENT
Start: 2018-08-08 | End: 2018-08-10

## 2018-08-08 RX ORDER — BICALUTAMIDE 50 MG/1
50 TABLET, FILM COATED ORAL DAILY
Status: DISCONTINUED | OUTPATIENT
Start: 2018-08-08 | End: 2018-08-10

## 2018-08-08 RX ORDER — ONDANSETRON 2 MG/ML
4 INJECTION INTRAMUSCULAR; INTRAVENOUS EVERY 4 HOURS PRN
Status: DISCONTINUED | OUTPATIENT
Start: 2018-08-08 | End: 2018-08-10

## 2018-08-08 RX ORDER — TRAZODONE HYDROCHLORIDE 50 MG/1
25 TABLET ORAL NIGHTLY PRN
Status: DISCONTINUED | OUTPATIENT
Start: 2018-08-08 | End: 2018-08-10

## 2018-08-08 RX ORDER — HEPARIN SODIUM 5000 [USP'U]/ML
5000 INJECTION, SOLUTION INTRAVENOUS; SUBCUTANEOUS EVERY 8 HOURS SCHEDULED
Status: DISCONTINUED | OUTPATIENT
Start: 2018-08-08 | End: 2018-08-10

## 2018-08-08 RX ORDER — SERTRALINE HYDROCHLORIDE 100 MG/1
100 TABLET, FILM COATED ORAL DAILY
Status: DISCONTINUED | OUTPATIENT
Start: 2018-08-08 | End: 2018-08-10

## 2018-08-08 RX ORDER — CETIRIZINE HYDROCHLORIDE 10 MG/1
10 TABLET ORAL DAILY
Status: DISCONTINUED | OUTPATIENT
Start: 2018-08-08 | End: 2018-08-10

## 2018-08-08 RX ORDER — HYDROCODONE BITARTRATE AND ACETAMINOPHEN 5; 325 MG/1; MG/1
1 TABLET ORAL EVERY 4 HOURS PRN
Status: DISCONTINUED | OUTPATIENT
Start: 2018-08-08 | End: 2018-08-10

## 2018-08-08 RX ORDER — ESCITALOPRAM OXALATE 5 MG/1
5 TABLET ORAL DAILY
Status: DISCONTINUED | OUTPATIENT
Start: 2018-08-08 | End: 2018-08-10

## 2018-08-08 RX ORDER — ACETAMINOPHEN 325 MG/1
650 TABLET ORAL EVERY 6 HOURS PRN
Status: DISCONTINUED | OUTPATIENT
Start: 2018-08-08 | End: 2018-08-10

## 2018-08-08 RX ORDER — SODIUM CHLORIDE 9 MG/ML
INJECTION, SOLUTION INTRAVENOUS CONTINUOUS
Status: DISCONTINUED | OUTPATIENT
Start: 2018-08-08 | End: 2018-08-10

## 2018-08-08 RX ORDER — LEVOFLOXACIN 5 MG/ML
750 INJECTION, SOLUTION INTRAVENOUS ONCE
Status: COMPLETED | OUTPATIENT
Start: 2018-08-08 | End: 2018-08-08

## 2018-08-08 RX ORDER — SODIUM CHLORIDE 9 MG/ML
125 INJECTION, SOLUTION INTRAVENOUS CONTINUOUS
Status: DISCONTINUED | OUTPATIENT
Start: 2018-08-08 | End: 2018-08-09

## 2018-08-08 RX ORDER — GARLIC EXTRACT 500 MG
1 CAPSULE ORAL DAILY
Status: DISCONTINUED | OUTPATIENT
Start: 2018-08-08 | End: 2018-08-10

## 2018-08-08 RX ORDER — DOCUSATE SODIUM 100 MG/1
100 CAPSULE, LIQUID FILLED ORAL 2 TIMES DAILY PRN
Status: DISCONTINUED | OUTPATIENT
Start: 2018-08-08 | End: 2018-08-10

## 2018-08-08 RX ORDER — SODIUM CHLORIDE 9 MG/ML
INJECTION, SOLUTION INTRAVENOUS CONTINUOUS
Status: ACTIVE | OUTPATIENT
Start: 2018-08-08 | End: 2018-08-08

## 2018-08-08 NOTE — ED NOTES
Call received from lab regarding pt's blood glucose. MD aware. Verbal order for po juice received. Pt in CT, will give when returns.

## 2018-08-08 NOTE — ED PROVIDER NOTES
Patient Seen in: [facility] Emergency Department    History   Patient presents with:  Fatigue (constitutional, neurologic)    Stated Complaint: weakness    HPI    This is a 63-year-old male who presents with generalized weakness, fatigue, poor appet stimulator placed  9/17/2014: OTHER SURGICAL HISTORY      Comment: Procedure: ESOPHAGOGASTRODUODENOSCOPY (EGD);                  Surgeon: Vandana Aburto MD;  Location: [address] [place] [address]                ENDOSCOPY  No date: REPAIR ING HERNIA,5+Y/O,REDUCIBL        Smoking reflexes are two out of four bilaterally and symmetric. The patient is otherwise neurovascularly intact. No pronator drift is noted.       ED Course     Labs Reviewed   COMP METABOLIC PANEL (14) - Abnormal; Notable for the following:        Result Value EKG including rate rhythm axis and intervals I agree with the EKG report . The rate is 75 there is some nonspecific ST changes poor R-wave progression. No acute ST elevations old inferior infarct  The patient's comprehensive shows a glucose of 66.   The pa Index Registry. PATIENT STATED HISTORY: (As transcribed by Technologist)  Patient states he has been feeling weak with no appetite. He states he has lost around 45 lbs over the past few months.    FINDINGS: Parenchymal volume loss without overt There is no

## 2018-08-08 NOTE — PLAN OF CARE
Assumed care of patient around 1600 from ED, alert and oriented X4, but can be forgetful, no c/o CP/SOB, SpO2 98 % on RA  Pt resides at [facility] in [facility] living  Nursing navigator completed- reviewed medications with patient's social

## 2018-08-08 NOTE — H&P
[facility] HOSPITALIST  History and Physical     Delicia Santiago Patient Status:  Observation    12/15/1931 MRN [id number]   [facility] 4NW-A Attending Fermin Sanchez MD   Hosp Day # 0 PCP Baldev Knutson DO     Chief Complaint: Abdominal p OTHER SURGICAL HISTORY      Comment: bladder surgery  1/1/95: OTHER SURGICAL HISTORY      Comment: testis removed  6/7/13: OTHER SURGICAL HISTORY      Comment: Cysto-Dr. Niki Gambino  8/20/14: OTHER SURGICAL HISTORY      Comment: Spinal cord stimulator placed HYDROcodone-acetaminophen 5-325 MG Oral Tab Take 1 tablet by mouth every 4 (four) hours as needed. Disp: 20 tablet Rfl: 0   docusate sodium 100 MG Oral Cap Take 100 mg by mouth 2 (two) times daily as needed for constipation.  Disp: 40 capsule Rfl: 0   Sen 59.4 mL/min (based on SCr of 0.79 mg/dL). No results for input(s): PTP, INR in the last 168 hours. Recent Labs   Lab  08/08/18   0750   TROP  <0.046       Imaging: Imaging data reviewed in Epic. ASSESSMENT / PLAN:     1.  Urinary stress incontine

## 2018-08-08 NOTE — PROGRESS NOTES
08/08/18 1501   Clinical Encounter Type   Visited With Patient   Routine Visit (Responded to the consult/request)   Continue Visiting (Encouraged to call  as needed)   Patient Spiritual Encounters   Spiritual Assessment Completed Yes   Spiritual

## 2018-08-08 NOTE — PROGRESS NOTES
[facility] Pharmacy Note: Renal Dose Adjustment for: Kenia Solares is a 80year old male who has been prescribed Levaquin 500mg for one dose. CrCl is estimated creatinine clearance is 59.4 mL/min (based on SCr of 0.79 mg/dL). .  Based on this crite

## 2018-08-08 NOTE — ED INITIAL ASSESSMENT (HPI)
Pt's legs feel weak since waking up this morning. Was unable to stand. Also complaining of constipation. States he was recently at [facility] for weakness, was discharged home about 1 week ago, unsure of any findings.

## 2018-08-09 LAB
ANION GAP SERPL CALC-SCNC: 11 MMOL/L (ref 0–18)
BUN BLD-MCNC: 9 MG/DL (ref 8–20)
BUN/CREAT SERPL: 11.4 (ref 10–20)
CALCIUM BLD-MCNC: 8.4 MG/DL (ref 8.3–10.3)
CHLORIDE SERPL-SCNC: 110 MMOL/L (ref 101–111)
CO2 SERPL-SCNC: 23 MMOL/L (ref 22–32)
CREAT BLD-MCNC: 0.79 MG/DL (ref 0.7–1.3)
GLUCOSE BLD-MCNC: 75 MG/DL (ref 70–99)
OSMOLALITY SERPL CALC.SUM OF ELEC: 295 MOSM/KG (ref 275–295)
POTASSIUM SERPL-SCNC: 3.8 MMOL/L (ref 3.6–5.1)
SODIUM SERPL-SCNC: 144 MMOL/L (ref 136–144)

## 2018-08-09 PROCEDURE — 99225 SUBSEQUENT OBSERVATION CARE: CPT | Performed by: HOSPITALIST

## 2018-08-09 RX ORDER — LEVOFLOXACIN 5 MG/ML
750 INJECTION, SOLUTION INTRAVENOUS EVERY 24 HOURS
Status: DISCONTINUED | OUTPATIENT
Start: 2018-08-09 | End: 2018-08-10

## 2018-08-09 RX ORDER — NYSTATIN 100000 U/G
CREAM TOPICAL 2 TIMES DAILY
Status: DISCONTINUED | OUTPATIENT
Start: 2018-08-09 | End: 2018-08-10

## 2018-08-09 RX ORDER — LIDOCAINE HYDROCHLORIDE 20 MG/ML
10 JELLY TOPICAL ONCE
Status: COMPLETED | OUTPATIENT
Start: 2018-08-09 | End: 2018-08-09

## 2018-08-09 NOTE — PROGRESS NOTES
A&O times 4. Anxious at start of shift. Complaints of left side hip pain. norco given per mar and waffle cushion placed with some relief. Pt has chronic duron, draining clear yellow urine. Leg bag switched to duron bag. Up with assist with walker.   I

## 2018-08-09 NOTE — PROGRESS NOTES
[facility]  Urology Progress Note    Cydney Diego Patient Status:  Inpatient    12/15/1931 MRN [id number]   [facility] 4NW-A Attending Nanci Viramontes MD   [facility] Day # 1 PCP Demarcus Hidalgo DO     Subjective:  Cydney Diego is

## 2018-08-09 NOTE — CM/SW NOTE
08/09/18 1200   CM/SW Referral Data   Referral Source Social Work (self-referral)   Reason for Referral Discharge planning   Informant Patient   Readmission Assessment   Was full assessment completed by FLETCHER/DREAD on prior admission?  Yes   Patient Info   Lesli Quiroz

## 2018-08-09 NOTE — DIETARY MALNUTRITION NOTE
[facility]    NUTRITION INITIAL ASSESSMENT    Pt meets severe malnutrition criteria.     CRITERIA FOR MALNUTRITION DIAGNOSIS:  Criteria for severe malnutrition diagnosis: chronic illness related to wt loss greater than 5% in 1 month, energy intake less oz)  08/07/18 : 62.6 kg (138 lb)  07/23/18 : 64.7 kg (142 lb 9.6 oz)  07/19/18 : 66.6 kg (146 lb 12.8 oz)  07/16/18 : 68.1 kg (150 lb 3.2 oz)  07/09/18 : 66.3 kg (146 lb 1.6 oz)      NUTRITION:  Diet: Cardiac  Oral Supplements: enlive    FOOD/NUTRITION REL

## 2018-08-09 NOTE — PLAN OF CARE
Pt has a chronic duron, pt admitted with duron in place, cloudy yellow urine with sediment noted. Urology states duron was last changed in June of 2018.  Gave patient oral norco 30 minutes prior to removal of old duron and used urojet lidocaine for removal

## 2018-08-09 NOTE — PROGRESS NOTES
[facility]ITAL[facility]  Progress Note     Kwok Double Patient Status:  Inpatient    12/15/1931 MRN [id number]   [facility] 4NW-A Attending Alyson Bocanegra MD   Hosp Day # 1 PCP Laxmi Barriga DO     Chief Complaint: abd pain    S: Patient 5,000 Units Subcutaneous [facility]       ASSESSMENT / PLAN:     1. Urinary stress incontinence and to change Diaz catheter  2. Radical prostatectomy for history of prostate cancer  3.  UTI continue Levaquin until urine cultures available    Quality:  · DVT P

## 2018-08-09 NOTE — CM/SW NOTE
Patient failed inpatient criteria. Second level of review completed and supports observation. UR committee in agreement. Per Emil Coleman discussed with Dr. Reji Mack  who approves observation status. Observation letter given to the patient and order written.

## 2018-08-10 VITALS
OXYGEN SATURATION: 98 % | WEIGHT: 138 LBS | RESPIRATION RATE: 18 BRPM | DIASTOLIC BLOOD PRESSURE: 74 MMHG | TEMPERATURE: 99 F | HEIGHT: 67 IN | HEART RATE: 70 BPM | SYSTOLIC BLOOD PRESSURE: 150 MMHG | BODY MASS INDEX: 21.66 KG/M2

## 2018-08-10 PROCEDURE — 99217 OBSERVATION CARE DISCHARGE: CPT | Performed by: HOSPITALIST

## 2018-08-10 RX ORDER — NITROFURANTOIN 25; 75 MG/1; MG/1
100 CAPSULE ORAL 2 TIMES DAILY
Status: DISCONTINUED | OUTPATIENT
Start: 2018-08-10 | End: 2018-08-10

## 2018-08-10 RX ORDER — POLYETHYLENE GLYCOL 3350 17 G/17G
17 POWDER, FOR SOLUTION ORAL DAILY
Status: DISCONTINUED | OUTPATIENT
Start: 2018-08-10 | End: 2018-08-10

## 2018-08-10 RX ORDER — POLYETHYLENE GLYCOL 3350 17 G/17G
17 POWDER, FOR SOLUTION ORAL DAILY PRN
Qty: 30 EACH | Refills: 0 | Status: SHIPPED | OUTPATIENT
Start: 2018-08-10 | End: 2019-01-07 | Stop reason: ALTCHOICE

## 2018-08-10 NOTE — PLAN OF CARE
GENITOURINARY - ADULT    • Absence of urinary retention Progressing        MUSCULOSKELETAL - ADULT    • Return mobility to safest level of function Progressing    • Maintain proper alignment of affected body part Progressing        SKIN/TISSUE INTEGRITY -

## 2018-08-11 NOTE — PROGRESS NOTES
NURSING DISCHARGE NOTE    Discharged Home via Wheelchair. Accompanied by Family member  Belongings Taken by patient/family   Home instructions given by previous MAE Alejandro

## 2018-08-11 NOTE — PLAN OF CARE
Patient has a chronic duron, patent draining yellow urine, was complaining of constipation, and finally had a bowel movmeant,[facility] accompanied by elana

## 2018-08-13 ENCOUNTER — TELEPHONE (OUTPATIENT)
Dept: FAMILY MEDICINE CLINIC | Facility: CLINIC | Age: 83
End: 2018-08-13

## 2018-08-13 ENCOUNTER — PATIENT OUTREACH (OUTPATIENT)
Dept: CASE MANAGEMENT | Age: 83
End: 2018-08-13

## 2018-08-13 NOTE — TELEPHONE ENCOUNTER
Spoke with pt today for TCM. Pt discharged to his senior living facility ([facility]) as of 08/10/18. Pt states he is having pain on his right side and requesting a prescription for hydrocodone-acteminophen 5-325mg.

## 2018-08-13 NOTE — DISCHARGE SUMMARY
[facility] HOSPITALIST  DISCHARGE SUMMARY     Anjum Jimenez Patient Status:  Observation    12/15/1931 MRN [id number]   [facility] 4NW-A Attending No att. providers found   Hosp Day # 1 PCP Frank Baker, DO     Date of Admission: 20 Tabs  Commonly known as:  CASODEX      TAKE 1 TABLET BY MOUTH EVERY DAY   Quantity:  90 tablet  Refills:  0     docusate sodium 100 MG Caps  Commonly known as:  COLACE      Take 100 mg by mouth 2 (two) times daily as needed for constipation.    Quantity:  4 appointment:   Isac Klein MD  [address] [facility] [address]  [phone]    Schedule an appointment as soon as possible for a visit in 4 weeks  duron catheter change.   Renal US to be done in September 2018    Isac Klein MD  79

## 2018-08-13 NOTE — PROGRESS NOTES
Condition Update Post Discharge   Discharge Date: 8/10/18  Contact Date: 8/13/2018    Consent Verification:  Assessment Completed With: Patient  HIPAA Verified? Yes    General:   • How have you been since your discharge from the hospital/facility?  Good Calcium Carbonate-Vitamin D (OYSTER SHELL CALCIUM/D) 500-200 MG-UNIT Oral Tab Take 1 tablet by mouth daily. Disp:  Rfl:    HYDROcodone-acetaminophen 5-325 MG Oral Tab Take 1 tablet by mouth every 4 (four) hours as needed.  Disp: 20 tablet Rfl: 0   docusat applicable:  AMI:  .TCMAMITEMP  CHF:  .TCMCHFTEMP  COPD: .TCMCOPDTEMP  CVA: .TCMCVATEMP  CV Surgery:  . TCMCVSURGERY  Pneumonia: . TCMPNEUMONIATEMP  Ortho/Spine:  TCMORTHOSPINETEMP  Re-admit:  . TCMREADMITTEMP          Needs post D/C:   Now that you are home,

## 2018-08-14 ENCOUNTER — TELEPHONE (OUTPATIENT)
Dept: FAMILY MEDICINE CLINIC | Facility: CLINIC | Age: 83
End: 2018-08-14

## 2018-08-14 DIAGNOSIS — F32.A DEPRESSION, UNSPECIFIED DEPRESSION TYPE: ICD-10-CM

## 2018-08-14 DIAGNOSIS — M47.818 ARTHRITIS OF SACROILIAC JOINT: ICD-10-CM

## 2018-08-14 DIAGNOSIS — D62 ACUTE BLOOD LOSS ANEMIA: ICD-10-CM

## 2018-08-14 DIAGNOSIS — M54.16 LUMBAR RADICULITIS: ICD-10-CM

## 2018-08-14 NOTE — TELEPHONE ENCOUNTER
NEEDS NEW RX'S SENT TO THEM FOR REFILLS. THIS IS A NEW PHARMACY FOR THE PATIENT. SHE CAN GO OVER THE MED LIST WITH A NURSE AND DO VERBAL ORDERS AS PATIENT LIVES IN ASSISTED LIVING AND THEY GOT HIS MED LIST FROM A NURSE THERE. PLS CALL HER BACK.

## 2018-08-16 ENCOUNTER — LAB ENCOUNTER (OUTPATIENT)
Dept: LAB | Age: 83
End: 2018-08-16
Attending: NURSE PRACTITIONER
Payer: MEDICARE

## 2018-08-16 DIAGNOSIS — Z47.89 ORTHOPEDIC AFTERCARE: ICD-10-CM

## 2018-08-16 DIAGNOSIS — M25.451 HIP SWELLING, RIGHT: ICD-10-CM

## 2018-08-16 PROCEDURE — 87075 CULTR BACTERIA EXCEPT BLOOD: CPT

## 2018-08-16 PROCEDURE — 87205 SMEAR GRAM STAIN: CPT

## 2018-08-16 PROCEDURE — 87070 CULTURE OTHR SPECIMN AEROBIC: CPT

## 2018-08-16 RX ORDER — FLUTICASONE PROPIONATE 50 MCG
SPRAY, SUSPENSION (ML) NASAL
Qty: 3 BOTTLE | Refills: 0 | Status: ON HOLD | OUTPATIENT
Start: 2018-08-16 | End: 2021-03-03

## 2018-08-16 RX ORDER — SERTRALINE HYDROCHLORIDE 100 MG/1
100 TABLET, FILM COATED ORAL
Qty: 90 TABLET | Refills: 0 | Status: SHIPPED | OUTPATIENT
Start: 2018-08-16 | End: 2019-05-01

## 2018-08-16 RX ORDER — DIAPER,BRIEF,INFANT-TODD,DISP
1 EACH MISCELLANEOUS 2 TIMES DAILY
Qty: 56 G | Refills: 0 | Status: ON HOLD | OUTPATIENT
Start: 2018-08-16 | End: 2021-03-03

## 2018-08-16 RX ORDER — FERROUS SULFATE 325(65) MG
325 TABLET ORAL
Qty: 90 TABLET | Refills: 0 | Status: SHIPPED | OUTPATIENT
Start: 2018-08-16

## 2018-08-16 RX ORDER — FAMOTIDINE 20 MG/1
20 TABLET ORAL 2 TIMES DAILY
Qty: 180 TABLET | Refills: 0 | Status: SHIPPED | OUTPATIENT
Start: 2018-08-16 | End: 2019-05-01

## 2018-08-16 RX ORDER — B-COMPLEX WITH VITAMIN C
1 TABLET ORAL DAILY
Qty: 30 TABLET | Refills: 0 | Status: SHIPPED | OUTPATIENT
Start: 2018-08-16

## 2018-08-16 RX ORDER — BILBERRY FRUIT 1000 MG
1 CAPSULE ORAL DAILY
Qty: 90 CAPSULE | Refills: 0 | Status: ON HOLD | OUTPATIENT
Start: 2018-08-16 | End: 2021-03-03

## 2018-08-16 RX ORDER — SENNOSIDES 8.6 MG
8.6 TABLET ORAL 2 TIMES DAILY
Qty: 180 TABLET | Refills: 0 | Status: ON HOLD | OUTPATIENT
Start: 2018-08-16 | End: 2021-02-28

## 2018-08-16 RX ORDER — PSEUDOEPHEDRINE HCL 30 MG
100 TABLET ORAL 2 TIMES DAILY PRN
Qty: 40 CAPSULE | Refills: 0 | Status: ON HOLD | OUTPATIENT
Start: 2018-08-16 | End: 2021-02-28

## 2018-08-16 RX ORDER — FEXOFENADINE HCL 180 MG/1
180 TABLET ORAL DAILY PRN
Qty: 180 TABLET | Refills: 0 | Status: ON HOLD | OUTPATIENT
Start: 2018-08-16 | End: 2021-03-03

## 2018-08-16 NOTE — TELEPHONE ENCOUNTER
Reviewed faxed over medication requests with [facility], sent e-scripts for approved medications per [facility] to pharmacy, informed pharmacist Darius Groves of this.   Denied medications per [facility]: Alprazolam, Bicalutamide, Escitalopram, Loratadine, Trazodone, Trospium, Probiotic,

## 2018-08-19 DIAGNOSIS — F41.1 GENERALIZED ANXIETY DISORDER: ICD-10-CM

## 2018-08-20 RX ORDER — TRAZODONE HYDROCHLORIDE 50 MG/1
TABLET ORAL
Qty: 90 TABLET | Refills: 0 | Status: SHIPPED | OUTPATIENT
Start: 2018-08-20 | End: 2018-11-18

## 2018-08-20 RX ORDER — FAMOTIDINE 20 MG/1
TABLET ORAL
Qty: 180 TABLET | Refills: 0 | Status: SHIPPED | OUTPATIENT
Start: 2018-08-20 | End: 2018-11-18

## 2018-08-24 ENCOUNTER — PATIENT OUTREACH (OUTPATIENT)
Dept: CASE MANAGEMENT | Age: 83
End: 2018-08-24

## 2018-09-05 ENCOUNTER — HOSPITAL ENCOUNTER (EMERGENCY)
Facility: HOSPITAL | Age: 83
Discharge: HOME | End: 2018-09-05
Attending: EMERGENCY MEDICINE
Payer: MEDICARE

## 2018-09-05 ENCOUNTER — APPOINTMENT (OUTPATIENT)
Dept: GENERAL RADIOLOGY | Facility: HOSPITAL | Age: 83
End: 2018-09-05
Attending: EMERGENCY MEDICINE
Payer: MEDICARE

## 2018-09-05 VITALS
WEIGHT: 138 LBS | RESPIRATION RATE: 21 BRPM | TEMPERATURE: 99 F | DIASTOLIC BLOOD PRESSURE: 72 MMHG | OXYGEN SATURATION: 94 % | SYSTOLIC BLOOD PRESSURE: 117 MMHG | BODY MASS INDEX: 20.92 KG/M2 | HEIGHT: 68 IN | HEART RATE: 96 BPM

## 2018-09-05 DIAGNOSIS — R20.2 TINGLING OF BOTH FEET: Primary | ICD-10-CM

## 2018-09-05 LAB
ANION GAP SERPL CALC-SCNC: 13 MMOL/L (ref 0–18)
BASOPHILS # BLD AUTO: 0.05 X10(3) UL (ref 0–0.1)
BASOPHILS NFR BLD AUTO: 0.9 %
BUN BLD-MCNC: 14 MG/DL (ref 8–20)
BUN/CREAT SERPL: 14.9 (ref 10–20)
CALCIUM BLD-MCNC: 8.3 MG/DL (ref 8.3–10.3)
CHLORIDE SERPL-SCNC: 108 MMOL/L (ref 101–111)
CO2 SERPL-SCNC: 20 MMOL/L (ref 22–32)
CREAT BLD-MCNC: 0.94 MG/DL (ref 0.7–1.3)
EOSINOPHIL # BLD AUTO: 0.04 X10(3) UL (ref 0–0.3)
EOSINOPHIL NFR BLD AUTO: 0.7 %
ERYTHROCYTE [DISTWIDTH] IN BLOOD BY AUTOMATED COUNT: 14.6 % (ref 11.5–16)
GLUCOSE BLD-MCNC: 125 MG/DL (ref 70–99)
HCT VFR BLD AUTO: 35.6 % (ref 37–53)
HGB BLD-MCNC: 12.2 G/DL (ref 13–17)
IMMATURE GRANULOCYTE COUNT: 0.04 X10(3) UL (ref 0–1)
IMMATURE GRANULOCYTE RATIO %: 0.7 %
LYMPHOCYTES # BLD AUTO: 1.99 X10(3) UL (ref 0.9–4)
LYMPHOCYTES NFR BLD AUTO: 37.1 %
MCH RBC QN AUTO: 31.6 PG (ref 27–33.2)
MCHC RBC AUTO-ENTMCNC: 34.3 G/DL (ref 31–37)
MCV RBC AUTO: 92.2 FL (ref 80–99)
MONOCYTES # BLD AUTO: 0.5 X10(3) UL (ref 0.1–1)
MONOCYTES NFR BLD AUTO: 9.3 %
NEUTROPHIL ABS PRELIM: 2.75 X10 (3) UL (ref 1.3–6.7)
NEUTROPHILS # BLD AUTO: 2.75 X10(3) UL (ref 1.3–6.7)
NEUTROPHILS NFR BLD AUTO: 51.3 %
OSMOLALITY SERPL CALC.SUM OF ELEC: 294 MOSM/KG (ref 275–295)
PLATELET # BLD AUTO: 289 10(3)UL (ref 150–450)
POTASSIUM SERPL-SCNC: 3.5 MMOL/L (ref 3.6–5.1)
RBC # BLD AUTO: 3.86 X10(6)UL (ref 3.8–5.8)
RED CELL DISTRIBUTION WIDTH-SD: 49.1 FL (ref 35.1–46.3)
SODIUM SERPL-SCNC: 141 MMOL/L (ref 136–144)
WBC # BLD AUTO: 5.4 X10(3) UL (ref 4–13)

## 2018-09-05 PROCEDURE — 36415 COLL VENOUS BLD VENIPUNCTURE: CPT

## 2018-09-05 PROCEDURE — 99284 EMERGENCY DEPT VISIT MOD MDM: CPT

## 2018-09-05 PROCEDURE — 80048 BASIC METABOLIC PNL TOTAL CA: CPT | Performed by: EMERGENCY MEDICINE

## 2018-09-05 PROCEDURE — 85025 COMPLETE CBC W/AUTO DIFF WBC: CPT | Performed by: EMERGENCY MEDICINE

## 2018-09-05 PROCEDURE — 72100 X-RAY EXAM L-S SPINE 2/3 VWS: CPT | Performed by: EMERGENCY MEDICINE

## 2018-09-05 RX ORDER — ACETAMINOPHEN 325 MG/1
325 TABLET ORAL ONCE
Status: COMPLETED | OUTPATIENT
Start: 2018-09-05 | End: 2018-09-05

## 2018-09-05 NOTE — ED INITIAL ASSESSMENT (HPI)
Pt had recent R hip replacement. Pt states he noticed numbness and tingling to both of his legs from thigh down. Pt denies or recent travels.

## 2018-09-06 NOTE — ED PROVIDER NOTES
Patient Seen in: [facility] Emergency Department    History   Patient presents with:  Numbness Weakness (neurologic)    Stated Complaint: bilateral tingling to legs    HPI    80year-old male here concerned about paresthesias in both of his feet.   H Siddharthao-Dr. Kiesha Cavazos  8/20/14: OTHER SURGICAL HISTORY      Comment: Spinal cord stimulator placed  9/17/2014: OTHER SURGICAL HISTORY      Comment: Procedure: ESOPHAGOGASTRODUODENOSCOPY (EGD);                  Surgeon: Alonso Simpson MD;  Location: [address] [place] [address] extension. 5 out of 5 strength with plantar flexion and dorsiflexion of ankle. 5 out of 5 strength with bilateral EHL. Babinski's downgoing bilaterally. Reflexes 2+ at bilateral Achilles and patellar tendons. Normal gait.     Sensation [place] intact to [place] [id number]  [phone]              Medications Prescribed:  Current Discharge Medication List

## 2018-09-06 NOTE — ED NOTES
[facility] ambulance was called transfer to [address] [place] [address] in [address] [facility] around 60 minutes  They will attempt to turn it around

## 2018-09-07 ENCOUNTER — HOSPITAL ENCOUNTER (EMERGENCY)
Facility: HOSPITAL | Age: 83
Discharge: HOME | End: 2018-09-07
Attending: EMERGENCY MEDICINE
Payer: MEDICARE

## 2018-09-07 ENCOUNTER — APPOINTMENT (OUTPATIENT)
Dept: GENERAL RADIOLOGY | Facility: HOSPITAL | Age: 83
End: 2018-09-07
Attending: EMERGENCY MEDICINE
Payer: MEDICARE

## 2018-09-07 ENCOUNTER — APPOINTMENT (OUTPATIENT)
Dept: CT IMAGING | Facility: HOSPITAL | Age: 83
End: 2018-09-07
Attending: EMERGENCY MEDICINE
Payer: MEDICARE

## 2018-09-07 VITALS
HEART RATE: 75 BPM | BODY MASS INDEX: 22.5 KG/M2 | RESPIRATION RATE: 16 BRPM | HEIGHT: 66 IN | DIASTOLIC BLOOD PRESSURE: 88 MMHG | WEIGHT: 140 LBS | TEMPERATURE: 98 F | OXYGEN SATURATION: 97 % | SYSTOLIC BLOOD PRESSURE: 135 MMHG

## 2018-09-07 DIAGNOSIS — N30.00 ACUTE CYSTITIS WITHOUT HEMATURIA: Primary | ICD-10-CM

## 2018-09-07 LAB
ALBUMIN SERPL-MCNC: 3 G/DL (ref 3.5–4.8)
ALBUMIN/GLOB SERPL: 0.9 {RATIO} (ref 1–2)
ALP LIVER SERPL-CCNC: 59 U/L (ref 45–117)
ALT SERPL-CCNC: 10 U/L (ref 17–63)
ANION GAP SERPL CALC-SCNC: 7 MMOL/L (ref 0–18)
AST SERPL-CCNC: 17 U/L (ref 15–41)
BASOPHILS # BLD AUTO: 0.01 X10(3) UL (ref 0–0.1)
BASOPHILS NFR BLD AUTO: 0.2 %
BILIRUB SERPL-MCNC: 1 MG/DL (ref 0.1–2)
BILIRUB UR QL STRIP.AUTO: NEGATIVE
BUN BLD-MCNC: 14 MG/DL (ref 8–20)
BUN/CREAT SERPL: 18.9 (ref 10–20)
CALCIUM BLD-MCNC: 8 MG/DL (ref 8.3–10.3)
CHLORIDE SERPL-SCNC: 107 MMOL/L (ref 101–111)
CO2 SERPL-SCNC: 25 MMOL/L (ref 22–32)
COLOR UR AUTO: YELLOW
CREAT BLD-MCNC: 0.74 MG/DL (ref 0.7–1.3)
EOSINOPHIL # BLD AUTO: 0.03 X10(3) UL (ref 0–0.3)
EOSINOPHIL NFR BLD AUTO: 0.6 %
ERYTHROCYTE [DISTWIDTH] IN BLOOD BY AUTOMATED COUNT: 14.1 % (ref 11.5–16)
GLOBULIN PLAS-MCNC: 3.2 G/DL (ref 2.5–4)
GLUCOSE BLD-MCNC: 111 MG/DL (ref 70–99)
GLUCOSE UR STRIP.AUTO-MCNC: NEGATIVE MG/DL
HCT VFR BLD AUTO: 34.5 % (ref 37–53)
HGB BLD-MCNC: 11.2 G/DL (ref 13–17)
IMMATURE GRANULOCYTE COUNT: 0.02 X10(3) UL (ref 0–1)
IMMATURE GRANULOCYTE RATIO %: 0.4 %
LYMPHOCYTES # BLD AUTO: 1.07 X10(3) UL (ref 0.9–4)
LYMPHOCYTES NFR BLD AUTO: 22.4 %
M PROTEIN MFR SERPL ELPH: 6.2 G/DL (ref 6.1–8.3)
MCH RBC QN AUTO: 30.5 PG (ref 27–33.2)
MCHC RBC AUTO-ENTMCNC: 32.5 G/DL (ref 31–37)
MCV RBC AUTO: 94 FL (ref 80–99)
MONOCYTES # BLD AUTO: 0.34 X10(3) UL (ref 0.1–1)
MONOCYTES NFR BLD AUTO: 7.1 %
NEUTROPHIL ABS PRELIM: 3.31 X10 (3) UL (ref 1.3–6.7)
NEUTROPHILS # BLD AUTO: 3.31 X10(3) UL (ref 1.3–6.7)
NEUTROPHILS NFR BLD AUTO: 69.3 %
NITRITE UR QL STRIP.AUTO: NEGATIVE
OSMOLALITY SERPL CALC.SUM OF ELEC: 289 MOSM/KG (ref 275–295)
PH UR STRIP.AUTO: 5 [PH] (ref 4.5–8)
PLATELET # BLD AUTO: 242 10(3)UL (ref 150–450)
POTASSIUM SERPL-SCNC: 3.7 MMOL/L (ref 3.6–5.1)
PROT UR STRIP.AUTO-MCNC: 100 MG/DL
RBC # BLD AUTO: 3.67 X10(6)UL (ref 3.8–5.8)
RBC UR QL AUTO: NEGATIVE
RED CELL DISTRIBUTION WIDTH-SD: 48.4 FL (ref 35.1–46.3)
SODIUM SERPL-SCNC: 139 MMOL/L (ref 136–144)
SP GR UR STRIP.AUTO: 1.03 (ref 1–1.03)
TROPONIN I SERPL-MCNC: <0.046 NG/ML (ref ?–0.05)
UROBILINOGEN UR STRIP.AUTO-MCNC: 2 MG/DL
WBC # BLD AUTO: 4.8 X10(3) UL (ref 4–13)
YEAST URINE: PRESENT

## 2018-09-07 PROCEDURE — 99285 EMERGENCY DEPT VISIT HI MDM: CPT

## 2018-09-07 PROCEDURE — 36415 COLL VENOUS BLD VENIPUNCTURE: CPT

## 2018-09-07 PROCEDURE — 93005 ELECTROCARDIOGRAM TRACING: CPT

## 2018-09-07 PROCEDURE — 93010 ELECTROCARDIOGRAM REPORT: CPT

## 2018-09-07 PROCEDURE — 87077 CULTURE AEROBIC IDENTIFY: CPT | Performed by: EMERGENCY MEDICINE

## 2018-09-07 PROCEDURE — 85025 COMPLETE CBC W/AUTO DIFF WBC: CPT | Performed by: EMERGENCY MEDICINE

## 2018-09-07 PROCEDURE — 84484 ASSAY OF TROPONIN QUANT: CPT | Performed by: EMERGENCY MEDICINE

## 2018-09-07 PROCEDURE — 81001 URINALYSIS AUTO W/SCOPE: CPT | Performed by: EMERGENCY MEDICINE

## 2018-09-07 PROCEDURE — 87086 URINE CULTURE/COLONY COUNT: CPT | Performed by: EMERGENCY MEDICINE

## 2018-09-07 PROCEDURE — 70450 CT HEAD/BRAIN W/O DYE: CPT | Performed by: EMERGENCY MEDICINE

## 2018-09-07 PROCEDURE — 71045 X-RAY EXAM CHEST 1 VIEW: CPT | Performed by: EMERGENCY MEDICINE

## 2018-09-07 PROCEDURE — 80053 COMPREHEN METABOLIC PANEL: CPT | Performed by: EMERGENCY MEDICINE

## 2018-09-07 RX ORDER — SULFAMETHOXAZOLE AND TRIMETHOPRIM 800; 160 MG/1; MG/1
1 TABLET ORAL 2 TIMES DAILY
Qty: 14 TABLET | Refills: 0 | Status: SHIPPED | OUTPATIENT
Start: 2018-09-07 | End: 2018-09-14

## 2018-09-07 RX ORDER — LISINOPRIL 5 MG/1
5 TABLET ORAL DAILY
Status: ON HOLD | COMMUNITY
End: 2021-03-11

## 2018-09-07 NOTE — ED INITIAL ASSESSMENT (HPI)
Presents via [facility] ambulance for c/o abd pain and nausea x 3 days, decreased appetite. Loose Bm 2 days ago. States  \" I just don't feel like myself, I feel very weak\". From assisted living facility.

## 2018-09-07 NOTE — ED NOTES
Presents with indwelling urinary catheter . Pt states has had catheter in place over 20 years.  Pt states he believes catheter was changed on last hospital admission at [facility]

## 2018-09-07 NOTE — ED PROVIDER NOTES
Patient Seen in: [facility] Emergency Department    History   Patient presents with:  Nausea/Vomiting/Diarrhea (gastrointestinal)    Stated Complaint: NVD    HPI    This is an 14-year-old male complaining of weakness.   Patient states over the last 3 SURGERY  1/1/84: OTHER      Comment:  prostatectomy  No date: OTHER SURGICAL HISTORY      Comment:  bladder surgery  1/1/95: OTHER SURGICAL HISTORY      Comment:  testis removed  6/7/13: OTHER SURGICAL HISTORY      Comment:  Cysto-Dr. Scott Will  8/20/14: Lul Martinez following components:       Result Value    Glucose 111 (*)     Calcium, Total 8.0 (*)     Alt 10 (*)     Albumin 3.0 (*)     A/G Ratio 0.9 (*)     All other components within normal limits   URINALYSIS WITH CULTURE REFLEX - Abnormal; Notable for the follo 1426  Value: CALCULATED OSMOLALITY: 289  Comment: (Reviewed)         [facility]   Urinalysis showed 2150 WBCs CBC chemistry unremarkable the patient was started on Bactrim            Disposition and Plan     Clinical Impression:  Acute cystitis without hematuria

## 2018-09-09 LAB
ATRIAL RATE: 68 BPM
P AXIS: 24 DEGREES
P-R INTERVAL: 158 MS
Q-T INTERVAL: 366 MS
QRS DURATION: 86 MS
QTC CALCULATION (BEZET): 389 MS
R AXIS: -69 DEGREES
T AXIS: 7 DEGREES
VENTRICULAR RATE: 68 BPM

## 2018-09-10 RX ORDER — ALENDRONATE SODIUM 70 MG/1
TABLET ORAL
Qty: 12 TABLET | Refills: 0 | Status: SHIPPED | OUTPATIENT
Start: 2018-09-10 | End: 2018-11-23

## 2018-09-12 ENCOUNTER — TELEPHONE (OUTPATIENT)
Dept: FAMILY MEDICINE CLINIC | Facility: CLINIC | Age: 83
End: 2018-09-12

## 2018-09-12 NOTE — TELEPHONE ENCOUNTER
Alendronate 70 mg once a week on back order until November    10mg daily and 5 mg daily are available

## 2018-09-17 ENCOUNTER — TELEPHONE (OUTPATIENT)
Dept: FAMILY MEDICINE CLINIC | Facility: CLINIC | Age: 83
End: 2018-09-17

## 2018-09-17 RX ORDER — IBANDRONATE SODIUM 150 MG/1
150 TABLET, FILM COATED ORAL
Qty: 6 TABLET | Refills: 0 | Status: SHIPPED | OUTPATIENT
Start: 2018-09-17 | End: 2018-11-23

## 2018-09-17 NOTE — TELEPHONE ENCOUNTER
Bao called in regards to the prescription      ALENDRONATE 70 MG Oral Tab     States that it has been on back order. Does the doctor want him to wait or prescribe a different medication.     Please advise.

## 2018-09-20 RX ORDER — IBANDRONATE SODIUM 150 MG/1
150 TABLET, FILM COATED ORAL
Qty: 12 TABLET | Refills: 0 | Status: SHIPPED | OUTPATIENT
Start: 2018-09-20 | End: 2018-11-23

## 2018-09-20 NOTE — TELEPHONE ENCOUNTER
PHARMACY IS OUT OF [employer], is it ok to swithch to [facility] IN [place].  New script is pending please approve or deny

## 2018-09-27 RX ORDER — SERTRALINE HYDROCHLORIDE 100 MG/1
TABLET, FILM COATED ORAL
Qty: 90 TABLET | Refills: 0 | Status: SHIPPED | OUTPATIENT
Start: 2018-09-27 | End: 2019-01-11

## 2018-10-01 ENCOUNTER — APPOINTMENT (OUTPATIENT)
Dept: GENERAL RADIOLOGY | Facility: HOSPITAL | Age: 83
End: 2018-10-01
Payer: MEDICARE

## 2018-10-01 ENCOUNTER — APPOINTMENT (OUTPATIENT)
Dept: CT IMAGING | Facility: HOSPITAL | Age: 83
End: 2018-10-01
Attending: PHYSICIAN ASSISTANT
Payer: MEDICARE

## 2018-10-01 ENCOUNTER — HOSPITAL ENCOUNTER (EMERGENCY)
Facility: HOSPITAL | Age: 83
Discharge: HOME | End: 2018-10-01
Attending: EMERGENCY MEDICINE
Payer: MEDICARE

## 2018-10-01 VITALS
TEMPERATURE: 98 F | RESPIRATION RATE: 16 BRPM | BODY MASS INDEX: 19 KG/M2 | WEIGHT: 120 LBS | SYSTOLIC BLOOD PRESSURE: 137 MMHG | HEART RATE: 73 BPM | DIASTOLIC BLOOD PRESSURE: 92 MMHG | OXYGEN SATURATION: 97 %

## 2018-10-01 DIAGNOSIS — G89.29 ACUTE EXACERBATION OF CHRONIC LOW BACK PAIN: Primary | ICD-10-CM

## 2018-10-01 DIAGNOSIS — K59.00 CONSTIPATION, UNSPECIFIED CONSTIPATION TYPE: ICD-10-CM

## 2018-10-01 DIAGNOSIS — M54.50 ACUTE EXACERBATION OF CHRONIC LOW BACK PAIN: Primary | ICD-10-CM

## 2018-10-01 DIAGNOSIS — M48.50XA SPINAL COMPRESSION FRACTURE (HCC): ICD-10-CM

## 2018-10-01 PROCEDURE — 93010 ELECTROCARDIOGRAM REPORT: CPT | Performed by: PHYSICIAN ASSISTANT

## 2018-10-01 PROCEDURE — 74177 CT ABD & PELVIS W/CONTRAST: CPT | Performed by: PHYSICIAN ASSISTANT

## 2018-10-01 PROCEDURE — 87086 URINE CULTURE/COLONY COUNT: CPT | Performed by: EMERGENCY MEDICINE

## 2018-10-01 PROCEDURE — 85025 COMPLETE CBC W/AUTO DIFF WBC: CPT | Performed by: EMERGENCY MEDICINE

## 2018-10-01 PROCEDURE — 71275 CT ANGIOGRAPHY CHEST: CPT | Performed by: PHYSICIAN ASSISTANT

## 2018-10-01 PROCEDURE — 83690 ASSAY OF LIPASE: CPT | Performed by: EMERGENCY MEDICINE

## 2018-10-01 PROCEDURE — 74019 RADEX ABDOMEN 2 VIEWS: CPT

## 2018-10-01 PROCEDURE — 84484 ASSAY OF TROPONIN QUANT: CPT | Performed by: EMERGENCY MEDICINE

## 2018-10-01 PROCEDURE — 87077 CULTURE AEROBIC IDENTIFY: CPT | Performed by: EMERGENCY MEDICINE

## 2018-10-01 PROCEDURE — 96374 THER/PROPH/DIAG INJ IV PUSH: CPT

## 2018-10-01 PROCEDURE — 99285 EMERGENCY DEPT VISIT HI MDM: CPT

## 2018-10-01 PROCEDURE — 80053 COMPREHEN METABOLIC PANEL: CPT | Performed by: EMERGENCY MEDICINE

## 2018-10-01 PROCEDURE — 93005 ELECTROCARDIOGRAM TRACING: CPT

## 2018-10-01 PROCEDURE — 81015 MICROSCOPIC EXAM OF URINE: CPT | Performed by: EMERGENCY MEDICINE

## 2018-10-01 PROCEDURE — 80053 COMPREHEN METABOLIC PANEL: CPT

## 2018-10-01 PROCEDURE — 87186 SC STD MICRODIL/AGAR DIL: CPT | Performed by: EMERGENCY MEDICINE

## 2018-10-01 PROCEDURE — 85025 COMPLETE CBC W/AUTO DIFF WBC: CPT

## 2018-10-01 PROCEDURE — 81001 URINALYSIS AUTO W/SCOPE: CPT | Performed by: EMERGENCY MEDICINE

## 2018-10-01 RX ORDER — MORPHINE SULFATE 4 MG/ML
4 INJECTION, SOLUTION INTRAMUSCULAR; INTRAVENOUS ONCE
Status: COMPLETED | OUTPATIENT
Start: 2018-10-01 | End: 2018-10-01

## 2018-10-01 RX ORDER — HYDROCODONE BITARTRATE AND ACETAMINOPHEN 5; 325 MG/1; MG/1
1 TABLET ORAL EVERY 6 HOURS PRN
Qty: 10 TABLET | Refills: 0 | Status: SHIPPED | OUTPATIENT
Start: 2018-10-01 | End: 2018-10-08

## 2018-10-01 NOTE — ED NOTES
Pt status unchanged. [facility] recontacted at this time regarding delay in transport and states they will be here at approximately 1800.

## 2018-10-01 NOTE — ED NOTES
Pt arrived to ED with leg bag and duron in place. Pt's leg bag changed by Martinez Stephen RN due to need for clean urine specimen.

## 2018-10-01 NOTE — ED NOTES
Pt appears comfortable on cart. Pt states that he he has had back pain \"for months\". Pt states that he does not take anything at home for pain at this time.  Pt states he is here because of the chronic back pain, but also due to difficulty with bowels/joyce

## 2018-10-01 NOTE — ED NOTES
Pt assisted with dressing and up to bathroom with steady gait with use of walker. Pt given turkey sandwich and water.  Pt still unable to find ride home and now considering [facility], aware he will be billed for the ride but does not need to provide payment t

## 2018-10-01 NOTE — ED NOTES
Pt status unchanged. Pt appears comfortable. [facility] here. Pt transported with belongings via wheelchair back to home via [address].

## 2018-10-01 NOTE — ED NOTES
[facility] ETA approximately 2 hours.

## 2018-10-01 NOTE — ED NOTES
Pt status unchanged. Pt appears comfortable. Pt states, \"I have no money to get home. \" discussed with pt taking medicar and pt informed cost is $40 + $2 per mile. Pt refusing medicar a this time.  Message left for pt's brother antwan, number to pt's frie

## 2018-10-01 NOTE — ED NOTES
Pt tolerated sack lunch well. Pt instructed to push water at home due to CT and pt verbalizes understanding.

## 2018-10-01 NOTE — ED NOTES
Pt c/o increase in back pain, requesting pain medication. To discuss with NP upon return. Pt awake and alert, skin w/d,resps reg/unlabored. NP made aware and no new orders received.

## 2018-10-01 NOTE — ED NOTES
Pt awake and alert, skin w/d,resps reg/unlabored. Pt appears comfortable. Still attempting to locate ride home.

## 2018-10-01 NOTE — ED NOTES
Pt ambulated to bathroom with walker accompanied by RN with slow but steady gait. Pt would like to use the restroom to try to have a bowel movement. Pt states he has had some diarrhea and constipation.

## 2018-10-01 NOTE — ED PROVIDER NOTES
Patient Seen in: [facility] Emergency Department    History   Patient presents with:  Abdomen/Flank Pain (GI/)    Stated Complaint: bs 108 temp 97.9. given fent 50mcg    HPI    Patient is a 14-year-old male. Patient has moderate medical history. Comment:  Performed by David Gross MD at [facility] ENDOSCOPY  12/21/2013: Mercedez Huff; N/A      Comment:  Performed by Ben Honeycutt DO at [facility] ENDOSCOPY  12/19/2013: [place] Huff; N/A      Comment:  Performed by Hugo Gomez MD at [facility] ENDOSCOPY 72   Resp 18   Temp 97.8 °F (36.6 °C)   Temp src Temporal   SpO2 98 %   O2 Device None (Room air)       Current:/82   Pulse 77   Temp 97.8 °F (36.6 °C) (Temporal)   Resp 16   Wt 54.4 kg   SpO2 99%   BMI 19.37 kg/m²         Physical Exam    Gen: Well -----------         ------                     CBC W/ DIFFERENTIAL[[id number]]          Abnormal            Final result                 Please view results for these tests on the individual orders.    URINALYSIS WITH CULTURE REFLEX   NIDA DAWN RAI mass or axillary adenopathy. AORTA:  No aneurysm or dissection. VASCULATURE:  No visible pulmonary arterial thrombus or attenuation. ABDOMEN/PELVIS: LIVER:  No enlargement, atrophy, abnormal density, or significant focal lesion.   BILIARY:  No visible d series of the abdomen and pelvis were obtained. COMPARISON:  None.   INDICATIONS:  abdominal  symptoms  PATIENT STATED HISTORY: (As transcribed by Technologist)  The patient shares that he has had loose stool and a constant urge to move his bowels for two compression fracture (HCC)  Constipation, unspecified constipation type    Disposition:  Discharge  10/1/2018  3:20 pm    Follow-up:  Nathan Morin DO  [address] [place] [address]              Medications Prescribed:  Cecilia Hagan

## 2018-10-01 NOTE — ED NOTES
Pt placed on bedpan per request but pt unable to move bowels. Pt requesting to ambulate to bathroom.

## 2018-10-02 ENCOUNTER — TELEPHONE (OUTPATIENT)
Dept: FAMILY MEDICINE CLINIC | Facility: CLINIC | Age: 83
End: 2018-10-02

## 2018-10-03 RX ORDER — NITROFURANTOIN 25; 75 MG/1; MG/1
100 CAPSULE ORAL 2 TIMES DAILY
Qty: 20 CAPSULE | Refills: 0 | Status: SHIPPED | OUTPATIENT
Start: 2018-10-03 | End: 2018-10-13

## 2018-10-09 ENCOUNTER — TELEPHONE (OUTPATIENT)
Dept: FAMILY MEDICINE CLINIC | Facility: CLINIC | Age: 83
End: 2018-10-09

## 2018-10-09 NOTE — TELEPHONE ENCOUNTER
Spoke to [facility] nurse states new duron is leaking. Still getting correct amount in bad but leaking. Pt has an 18 Bengali in.  Per Dr. Grace Marin pt to follow up with urology.

## 2018-10-31 ENCOUNTER — HOSPITAL ENCOUNTER (EMERGENCY)
Facility: HOSPITAL | Age: 83
Discharge: HOME | End: 2018-10-31
Attending: EMERGENCY MEDICINE
Payer: MEDICARE

## 2018-10-31 VITALS
SYSTOLIC BLOOD PRESSURE: 107 MMHG | OXYGEN SATURATION: 95 % | WEIGHT: 119.94 LBS | HEIGHT: 67 IN | BODY MASS INDEX: 18.82 KG/M2 | HEART RATE: 105 BPM | RESPIRATION RATE: 21 BRPM | DIASTOLIC BLOOD PRESSURE: 68 MMHG | TEMPERATURE: 98 F

## 2018-10-31 DIAGNOSIS — R53.1 WEAKNESS GENERALIZED: Primary | ICD-10-CM

## 2018-10-31 DIAGNOSIS — N39.0 URINARY TRACT INFECTION WITHOUT HEMATURIA, SITE UNSPECIFIED: ICD-10-CM

## 2018-10-31 PROCEDURE — 87186 SC STD MICRODIL/AGAR DIL: CPT | Performed by: EMERGENCY MEDICINE

## 2018-10-31 PROCEDURE — 81001 URINALYSIS AUTO W/SCOPE: CPT | Performed by: EMERGENCY MEDICINE

## 2018-10-31 PROCEDURE — 83690 ASSAY OF LIPASE: CPT | Performed by: EMERGENCY MEDICINE

## 2018-10-31 PROCEDURE — 84443 ASSAY THYROID STIM HORMONE: CPT | Performed by: EMERGENCY MEDICINE

## 2018-10-31 PROCEDURE — 85025 COMPLETE CBC W/AUTO DIFF WBC: CPT | Performed by: EMERGENCY MEDICINE

## 2018-10-31 PROCEDURE — 87086 URINE CULTURE/COLONY COUNT: CPT | Performed by: EMERGENCY MEDICINE

## 2018-10-31 PROCEDURE — 99285 EMERGENCY DEPT VISIT HI MDM: CPT

## 2018-10-31 PROCEDURE — 84439 ASSAY OF FREE THYROXINE: CPT | Performed by: EMERGENCY MEDICINE

## 2018-10-31 PROCEDURE — 99284 EMERGENCY DEPT VISIT MOD MDM: CPT

## 2018-10-31 PROCEDURE — 87088 URINE BACTERIA CULTURE: CPT | Performed by: EMERGENCY MEDICINE

## 2018-10-31 PROCEDURE — 80053 COMPREHEN METABOLIC PANEL: CPT | Performed by: EMERGENCY MEDICINE

## 2018-10-31 PROCEDURE — 96374 THER/PROPH/DIAG INJ IV PUSH: CPT

## 2018-10-31 RX ORDER — SODIUM CHLORIDE 9 MG/ML
INJECTION, SOLUTION INTRAVENOUS ONCE
Status: COMPLETED | OUTPATIENT
Start: 2018-10-31 | End: 2018-10-31

## 2018-10-31 RX ORDER — SULFAMETHOXAZOLE AND TRIMETHOPRIM 800; 160 MG/1; MG/1
1 TABLET ORAL ONCE
Status: COMPLETED | OUTPATIENT
Start: 2018-10-31 | End: 2018-10-31

## 2018-10-31 RX ORDER — SULFAMETHOXAZOLE AND TRIMETHOPRIM 800; 160 MG/1; MG/1
1 TABLET ORAL 2 TIMES DAILY
Qty: 20 TABLET | Refills: 0 | Status: SHIPPED | OUTPATIENT
Start: 2018-10-31 | End: 2018-11-10

## 2018-10-31 RX ORDER — ONDANSETRON 2 MG/ML
4 INJECTION INTRAMUSCULAR; INTRAVENOUS
Status: DISCONTINUED | OUTPATIENT
Start: 2018-10-31 | End: 2018-10-31

## 2018-10-31 NOTE — ED PROVIDER NOTES
Patient Seen in: [facility] Emergency Department    History   Patient presents with:  Fatigue (constitutional, neurologic)    Stated Complaint: weakness    HPI    Patient has a history of chronic thoracic back pain, abnormal bowels varying between d Laterality Date   • BACK SURGERY  6/3/11    L4-5 decompression   • CHOLECYSTECTOMY     • ESOPHAGOGASTRODUODENOSCOPY (EGD) N/A 9/17/2014    Performed by Janak Tejada MD at [facility] ENDOSCOPY   • ESOPHAGOGASTRODUODENOSCOPY (EGD) N/A 12/30/2013    Performed by and vital signs reviewed. All other systems reviewed and negative except as noted above.     Physical Exam     ED Triage Vitals [10/31/18 1020]   /67   Pulse 94   Resp 18   Temp 97.7 °F (36.5 °C)   Temp src Temporal   SpO2 98 %   O2 Device None ( URINALYSIS WITH CULTURE REFLEX - Abnormal; Notable for the following components:    Clarity Urine Hazy (*)     Ketones Urine 20  (*)     Protein Urine 30  (*)     Nitrite Urine Positive (*)     Leukocyte Esterase Urine Small (*)     WBC Urine 11-20 (*) Creatinine is normal.  LFTs normal  TSH low with normal T4  Urinalysis shows 11-20 WBCs with positive nitrites and leukocytes. There were ketones.     Urine culture from earlier in the month grew organisms resistant to Cipro and Levaquin but sensitive to c

## 2018-10-31 NOTE — ED INITIAL ASSESSMENT (HPI)
Arrives from assisted living at [facility] where was complaining of weakness. Was initially hypotensive and hypoglycemic. Given a fluid bolus and glucose.

## 2018-10-31 NOTE — BH LEVEL OF CARE ASSESSMENT
Level of Care Assessment Note    General Questions  Why are you here?: Pt is a 80year old male who [facility] EMS for medical. Pt states \"I have been down and depressed for some time. \"  Precipitating Events:  This writer met with Pt in room in message due to voicemail being full. Reason Patient is Here Today: This writer spoke with the Pt brother Fawn Garner via phone. Pt brother says that the Pt has been depressed for over 40 years. Pt brother says that the Pt has had an unusual life.  He has been ma wanting someone harmed or killed in the past 30 days?: No  Have you harmed someone or had thoughts about wanting someone harmed or killed further back than 30 days?: No  Current or Past Harm Toward Animals: No  History or Allegations of Inappropriate Physi containing alcohol? : Never(Pt brother reports Pt has a Hx of being an alcoholic but will deney it. )       Illicit and Prescription Drug Use  Which if any illicit/prescription drugs have you used/abused?: Denies Clear  Cognition  Concentration: Unimpaired  Memory: Recent memory intact; Remote memory intact  Orientation Level: Oriented X4  Insight: Fair  Fair/poor insight as evidenced by: Pt is calm and cooperative.  Pt is aware of his Sx and is open and willing to s and is not currently seeing a psychiatrist.      Risk/Protective Factors  Risk Factors: Environmental stress;Physical Illness; No current treatment;Sustained stress  Protective Factors: Family    Motivational Stage of Change  Motivational Stage of Change: C

## 2018-11-01 ENCOUNTER — TELEPHONE (OUTPATIENT)
Dept: FAMILY MEDICINE CLINIC | Facility: CLINIC | Age: 83
End: 2018-11-01

## 2018-11-01 RX ORDER — BICALUTAMIDE 50 MG/1
TABLET, FILM COATED ORAL
Qty: 90 TABLET | Refills: 0 | Status: SHIPPED | OUTPATIENT
Start: 2018-11-01 | End: 2018-11-23

## 2018-11-04 ENCOUNTER — HOSPITAL ENCOUNTER (EMERGENCY)
Facility: HOSPITAL | Age: 83
Discharge: HOME | End: 2018-11-04
Attending: EMERGENCY MEDICINE
Payer: MEDICARE

## 2018-11-04 ENCOUNTER — APPOINTMENT (OUTPATIENT)
Dept: GENERAL RADIOLOGY | Facility: HOSPITAL | Age: 83
End: 2018-11-04
Attending: EMERGENCY MEDICINE
Payer: MEDICARE

## 2018-11-04 VITALS
OXYGEN SATURATION: 97 % | WEIGHT: 249.13 LBS | HEART RATE: 87 BPM | RESPIRATION RATE: 13 BRPM | HEIGHT: 66 IN | TEMPERATURE: 98 F | DIASTOLIC BLOOD PRESSURE: 85 MMHG | BODY MASS INDEX: 40.04 KG/M2 | SYSTOLIC BLOOD PRESSURE: 142 MMHG

## 2018-11-04 DIAGNOSIS — R53.1 WEAKNESS: ICD-10-CM

## 2018-11-04 DIAGNOSIS — N39.0 URINARY TRACT INFECTION WITHOUT HEMATURIA, SITE UNSPECIFIED: Primary | ICD-10-CM

## 2018-11-04 DIAGNOSIS — R63.0 LOSS OF APPETITE: ICD-10-CM

## 2018-11-04 DIAGNOSIS — R11.0 NAUSEA: ICD-10-CM

## 2018-11-04 PROCEDURE — 71045 X-RAY EXAM CHEST 1 VIEW: CPT | Performed by: EMERGENCY MEDICINE

## 2018-11-04 PROCEDURE — 80053 COMPREHEN METABOLIC PANEL: CPT | Performed by: EMERGENCY MEDICINE

## 2018-11-04 PROCEDURE — 87086 URINE CULTURE/COLONY COUNT: CPT | Performed by: EMERGENCY MEDICINE

## 2018-11-04 PROCEDURE — 81001 URINALYSIS AUTO W/SCOPE: CPT | Performed by: EMERGENCY MEDICINE

## 2018-11-04 PROCEDURE — 96374 THER/PROPH/DIAG INJ IV PUSH: CPT

## 2018-11-04 PROCEDURE — 99285 EMERGENCY DEPT VISIT HI MDM: CPT

## 2018-11-04 PROCEDURE — 85025 COMPLETE CBC W/AUTO DIFF WBC: CPT | Performed by: EMERGENCY MEDICINE

## 2018-11-04 PROCEDURE — 99284 EMERGENCY DEPT VISIT MOD MDM: CPT

## 2018-11-04 PROCEDURE — 83605 ASSAY OF LACTIC ACID: CPT | Performed by: EMERGENCY MEDICINE

## 2018-11-04 PROCEDURE — 96361 HYDRATE IV INFUSION ADD-ON: CPT

## 2018-11-04 RX ORDER — SULFAMETHOXAZOLE AND TRIMETHOPRIM 800; 160 MG/1; MG/1
1 TABLET ORAL ONCE
Status: COMPLETED | OUTPATIENT
Start: 2018-11-04 | End: 2018-11-04

## 2018-11-04 RX ORDER — SULFAMETHOXAZOLE AND TRIMETHOPRIM 800; 160 MG/1; MG/1
1 TABLET ORAL 2 TIMES DAILY
Qty: 14 TABLET | Refills: 0 | Status: SHIPPED | OUTPATIENT
Start: 2018-11-04 | End: 2018-11-11

## 2018-11-04 RX ORDER — SODIUM CHLORIDE 9 MG/ML
INJECTION, SOLUTION INTRAVENOUS ONCE
Status: COMPLETED | OUTPATIENT
Start: 2018-11-04 | End: 2018-11-04

## 2018-11-04 RX ORDER — ONDANSETRON 2 MG/ML
4 INJECTION INTRAMUSCULAR; INTRAVENOUS ONCE
Status: COMPLETED | OUTPATIENT
Start: 2018-11-04 | End: 2018-11-04

## 2018-11-04 RX ORDER — ONDANSETRON 2 MG/ML
INJECTION INTRAMUSCULAR; INTRAVENOUS
Status: COMPLETED
Start: 2018-11-04 | End: 2018-11-04

## 2018-11-04 RX ORDER — ONDANSETRON 4 MG/1
4 TABLET, ORALLY DISINTEGRATING ORAL EVERY 4 HOURS PRN
Qty: 10 TABLET | Refills: 0 | Status: SHIPPED | OUTPATIENT
Start: 2018-11-04 | End: 2018-11-11

## 2018-11-04 NOTE — ED PROVIDER NOTES
Patient Seen in: [facility] Emergency Department    History   Patient presents with:  Fatigue (constitutional, neurologic)  Poor Feed Anorexia (constitutional)    Stated Complaint: generalized weakness    HPI    Patient feels weak, nauseated, feels Lonnie Field MD at [facility] ENDOSCOPY   • [address]     • GASTROSCOPY N/A 12/23/2013    Performed by Lonnie Field MD at [facility] ENDOSCOPY   • GASTROSCOPY N/A 12/21/2013    Performed by Giselle Clark DO at [facility] [address] Device None (Room air)       Current:/85   Pulse 87   Temp 98.4 °F (36.9 °C) (Temporal)   Resp 13   Ht 167.6 cm (5' 6\")   Wt 113 kg   SpO2 97%   BMI 40.21 kg/m²         Physical Exam    General: Elderly, alert, joking and laughing with staff  Head a Abnormality         Status                     ---------                               -----------         ------                     CBC W/ DIFFERENTIAL[[id number]]          Abnormal            Final result                 Ple

## 2018-11-04 NOTE — ED INITIAL ASSESSMENT (HPI)
Pt here via ems with generalized weakness, pt stating he was here for the same symptoms x 1 wk ago and was sent back to assisted living. Pt stating he is concerned because his appetite is poor and he is using a walker due to a fractured hip 4-5 mos ago.

## 2018-11-04 NOTE — CM/SW NOTE
Called [facility]  [phone]    Bactrim, zofran called in    Will reach out to his caregiver Lenoard Leyden to inform her that the scripts need to be picked up.      Alex Klein @ [address] [place] [address] living  [phone]  Will be there till 5 pm today, to make sure pt

## 2018-11-08 NOTE — CM/SW NOTE
Spoke with patient on 11/5. His nurse is Sascha Holguin  [phone]    Left message for her to review antibiotic regimen.  Per his helper Hannah Yeboah ([phone]) he did start taking the ABX from his visit on the 31st.

## 2018-11-10 ENCOUNTER — HOSPITAL ENCOUNTER (EMERGENCY)
Facility: HOSPITAL | Age: 83
Discharge: HOME | End: 2018-11-10
Attending: EMERGENCY MEDICINE
Payer: MEDICARE

## 2018-11-10 ENCOUNTER — APPOINTMENT (OUTPATIENT)
Dept: CT IMAGING | Facility: HOSPITAL | Age: 83
End: 2018-11-10
Attending: EMERGENCY MEDICINE
Payer: MEDICARE

## 2018-11-10 VITALS
WEIGHT: 152 LBS | HEART RATE: 86 BPM | RESPIRATION RATE: 16 BRPM | SYSTOLIC BLOOD PRESSURE: 125 MMHG | DIASTOLIC BLOOD PRESSURE: 72 MMHG | TEMPERATURE: 98 F | OXYGEN SATURATION: 97 % | BODY MASS INDEX: 25 KG/M2

## 2018-11-10 DIAGNOSIS — R63.0 DECREASED APPETITE: ICD-10-CM

## 2018-11-10 DIAGNOSIS — N39.0 URINARY TRACT INFECTION WITHOUT HEMATURIA, SITE UNSPECIFIED: Primary | ICD-10-CM

## 2018-11-10 DIAGNOSIS — I31.3 PERICARDIAL EFFUSION: ICD-10-CM

## 2018-11-10 PROCEDURE — 85025 COMPLETE CBC W/AUTO DIFF WBC: CPT | Performed by: EMERGENCY MEDICINE

## 2018-11-10 PROCEDURE — 80053 COMPREHEN METABOLIC PANEL: CPT | Performed by: EMERGENCY MEDICINE

## 2018-11-10 PROCEDURE — 74177 CT ABD & PELVIS W/CONTRAST: CPT | Performed by: EMERGENCY MEDICINE

## 2018-11-10 PROCEDURE — 87086 URINE CULTURE/COLONY COUNT: CPT | Performed by: EMERGENCY MEDICINE

## 2018-11-10 PROCEDURE — 99285 EMERGENCY DEPT VISIT HI MDM: CPT | Performed by: EMERGENCY MEDICINE

## 2018-11-10 PROCEDURE — 99284 EMERGENCY DEPT VISIT MOD MDM: CPT | Performed by: EMERGENCY MEDICINE

## 2018-11-10 PROCEDURE — 96361 HYDRATE IV INFUSION ADD-ON: CPT | Performed by: EMERGENCY MEDICINE

## 2018-11-10 PROCEDURE — 96374 THER/PROPH/DIAG INJ IV PUSH: CPT | Performed by: EMERGENCY MEDICINE

## 2018-11-10 PROCEDURE — 81001 URINALYSIS AUTO W/SCOPE: CPT | Performed by: EMERGENCY MEDICINE

## 2018-11-10 PROCEDURE — 83690 ASSAY OF LIPASE: CPT | Performed by: EMERGENCY MEDICINE

## 2018-11-10 RX ORDER — NITROFURANTOIN 25; 75 MG/1; MG/1
100 CAPSULE ORAL 2 TIMES DAILY
Qty: 14 CAPSULE | Refills: 0 | Status: SHIPPED | OUTPATIENT
Start: 2018-11-10 | End: 2018-11-17

## 2018-11-10 RX ORDER — ONDANSETRON 2 MG/ML
4 INJECTION INTRAMUSCULAR; INTRAVENOUS ONCE
Status: COMPLETED | OUTPATIENT
Start: 2018-11-10 | End: 2018-11-10

## 2018-11-10 NOTE — ED NOTES
Round on pt. Pt sts there is no one to take him home. Pt requests medivan. Pt lives by himself. A/Ox3. No distress noted. Pt sts he took a medivan last time. Pt finished food tray.

## 2018-11-10 NOTE — ED NOTES
DC instructions and RX handed to pt. Pt assisted to getting dressed. Pt requests walker to ambulate to bathroom. Pt normally walks with a walker at home.

## 2018-11-10 NOTE — ED PROVIDER NOTES
Patient Seen in: [facility] Emergency Department    History   Patient presents with:  Weight Loss Gain (metabolic)    Stated Complaint: poor appitite     HPI    The patient is an 40-year-old male with a history of prostate cancer, treated in the dis unspecified    • Other and unspecified hyperlipidemia    • OTHER DISEASES    • Personal history of antineoplastic chemotherapy     1980S   • Personal history of contact with and (suspected) exposure to asbestos    • Unspecified essential hypertension    • Social History    Tobacco Use      Smoking status: Never Smoker      Smokeless tobacco: Never Used    Alcohol use:  Yes      Alcohol/week: 0.0 oz      Comment: 1-2 DRINKS PER DAY of scotch    Drug use: No      Review of Systems    Positive for sta abnormalities.   Psych: Normal interaction, cooperative with exam         ED Course     Labs Reviewed   COMP METABOLIC PANEL (14) - Abnormal; Notable for the following components:       Result Value    AST 13 (*)     Alt 15 (*)     A/G Ratio 0.9 (*)     All characterize. .  AORTA/VASCULAR:  Extensive atherosclerosis. RETROPERITONEUM:  Unremarkable. BOWEL/MESENTERY:  Scattered colonic diverticula. ABDOMINAL WALL:  Unremarkable. PELVIC ORGANS:  Bladder decompressed with a Diaz catheter.   LYMPH NODES:  Unrem condition.             Disposition and Plan     Clinical Impression:  Urinary tract infection without hematuria, site unspecified  (primary encounter diagnosis)  Pericardial effusion  Decreased appetite    Disposition:  Discharge  11/10/2018 10:11 am    Fol

## 2018-11-10 NOTE — ED NOTES
DC instructions and RX handed to pt. Pt thanks staff for care. No distress noted. Pt denies any needs prior to DC. EAS confirms pt is medivan transport. EAS informed pt needs walker.

## 2018-11-10 NOTE — ED NOTES
Pt call light on approx 5 times in last 30mins. Apologized to pt for delays. ER is experiencing a high volume morning. Pt c/o abd pain. MD updated.

## 2018-11-12 ENCOUNTER — TELEPHONE (OUTPATIENT)
Dept: FAMILY MEDICINE CLINIC | Facility: CLINIC | Age: 83
End: 2018-11-12

## 2018-11-12 NOTE — TELEPHONE ENCOUNTER
Annabella Gimenez is reaching out to us on behalf of patient. He is requesting palliative care.     Office needs Demographic Face Sheet  H&P  And order to treat for palliative services    Fax [phone]

## 2018-11-13 NOTE — TELEPHONE ENCOUNTER
His diagnosis from the ER visit were Urinary tract infection without hematuria, site unspecified  (primary encounter diagnosis)  Pericardial effusion  Decreased appetite also fatigue. Okay for palliative care order?

## 2018-11-15 ENCOUNTER — HOSPITAL ENCOUNTER (EMERGENCY)
Facility: HOSPITAL | Age: 83
Discharge: HOME | End: 2018-11-15
Attending: EMERGENCY MEDICINE
Payer: MEDICARE

## 2018-11-15 VITALS
BODY MASS INDEX: 18.83 KG/M2 | OXYGEN SATURATION: 97 % | SYSTOLIC BLOOD PRESSURE: 130 MMHG | WEIGHT: 120 LBS | DIASTOLIC BLOOD PRESSURE: 86 MMHG | TEMPERATURE: 97 F | RESPIRATION RATE: 22 BRPM | HEIGHT: 67 IN | HEART RATE: 70 BPM

## 2018-11-15 DIAGNOSIS — R53.1 WEAKNESS GENERALIZED: ICD-10-CM

## 2018-11-15 DIAGNOSIS — R63.0 ANOREXIA: Primary | ICD-10-CM

## 2018-11-15 PROCEDURE — 99284 EMERGENCY DEPT VISIT MOD MDM: CPT

## 2018-11-15 PROCEDURE — 87086 URINE CULTURE/COLONY COUNT: CPT | Performed by: EMERGENCY MEDICINE

## 2018-11-15 PROCEDURE — 96361 HYDRATE IV INFUSION ADD-ON: CPT

## 2018-11-15 PROCEDURE — 96374 THER/PROPH/DIAG INJ IV PUSH: CPT

## 2018-11-15 PROCEDURE — 85025 COMPLETE CBC W/AUTO DIFF WBC: CPT | Performed by: EMERGENCY MEDICINE

## 2018-11-15 PROCEDURE — 81001 URINALYSIS AUTO W/SCOPE: CPT | Performed by: EMERGENCY MEDICINE

## 2018-11-15 PROCEDURE — 87106 FUNGI IDENTIFICATION YEAST: CPT | Performed by: EMERGENCY MEDICINE

## 2018-11-15 PROCEDURE — 80053 COMPREHEN METABOLIC PANEL: CPT | Performed by: EMERGENCY MEDICINE

## 2018-11-15 RX ORDER — ACETAMINOPHEN 500 MG
1000 TABLET ORAL ONCE
Status: COMPLETED | OUTPATIENT
Start: 2018-11-15 | End: 2018-11-15

## 2018-11-15 RX ORDER — MORPHINE SULFATE 4 MG/ML
2 INJECTION, SOLUTION INTRAMUSCULAR; INTRAVENOUS ONCE
Status: COMPLETED | OUTPATIENT
Start: 2018-11-15 | End: 2018-11-15

## 2018-11-15 RX ORDER — SODIUM CHLORIDE 9 MG/ML
INJECTION, SOLUTION INTRAVENOUS ONCE
Status: COMPLETED | OUTPATIENT
Start: 2018-11-15 | End: 2018-11-15

## 2018-11-15 RX ORDER — MORPHINE SULFATE 4 MG/ML
INJECTION, SOLUTION INTRAMUSCULAR; INTRAVENOUS
Status: DISCONTINUED
Start: 2018-11-15 | End: 2018-11-15

## 2018-11-15 RX ORDER — SULFAMETHOXAZOLE AND TRIMETHOPRIM 800; 160 MG/1; MG/1
1 TABLET ORAL ONCE
Status: COMPLETED | OUTPATIENT
Start: 2018-11-15 | End: 2018-11-15

## 2018-11-15 NOTE — ED INITIAL ASSESSMENT (HPI)
PT c/o increased weakness and back pain. This has started 2 years ago and 'nobody can figure out what is wrong'. PT was here a week ago for same problem. PT states he does not have much of an appetite.

## 2018-11-15 NOTE — ED PROVIDER NOTES
Patient Seen in: [facility] Emergency Department    History   Patient presents with:  Muscle Pain    Stated Complaint:     HPI    Patient is an 75-year-old male who returns to some as part of her evaluation of weakness and poor appetite.   He also ha Va Kerr MD at [facility] ENDOSCOPY   • [address]     • GASTROSCOPY N/A 12/23/2013    Performed by Va Kerr MD at [facility] ENDOSCOPY   • GASTROSCOPY N/A 12/21/2013    Performed by Rich Guajardo DO at [address] 18.79 kg/m²         Physical Exam      Constitutional: No distress. Appears well-developed and well-nourished. Head: Normocephalic and atraumatic. Nose: Nose normal.   Eyes: EOM are normal. Pupils are equal, round, and reactive to light.    Neck: Normal Prelim 7.14 (*)     Neutrophil Absolute 7.14 (*)     All other components within normal limits   CBC WITH DIFFERENTIAL WITH PLATELET    Narrative: The following orders were created for panel order CBC WITH DIFFERENTIAL WITH PLATELET.   Procedure 3 doses. I will give him a dose here and discharge him. He states his medication at home. Afebrile, well-appearing, tolerating p.o. I have asked him to follow-up with Dr. Azael Knight.             Disposition and Plan     Clinical Impression:  Anorexia  (

## 2018-11-18 DIAGNOSIS — F41.1 GENERALIZED ANXIETY DISORDER: ICD-10-CM

## 2018-11-18 RX ORDER — FLUCONAZOLE 150 MG/1
150 TABLET ORAL DAILY
Qty: 1 TABLET | Refills: 0 | Status: ON HOLD | OUTPATIENT
Start: 2018-11-18 | End: 2021-03-03 | Stop reason: ALTCHOICE

## 2018-11-19 RX ORDER — FAMOTIDINE 20 MG/1
TABLET ORAL
Qty: 180 TABLET | Refills: 0 | Status: SHIPPED | OUTPATIENT
Start: 2018-11-19 | End: 2019-02-15

## 2018-11-19 RX ORDER — TRAZODONE HYDROCHLORIDE 50 MG/1
TABLET ORAL
Qty: 90 TABLET | Refills: 0 | Status: SHIPPED | OUTPATIENT
Start: 2018-11-19 | End: 2018-11-23

## 2018-11-21 ENCOUNTER — TELEPHONE (OUTPATIENT)
Dept: FAMILY MEDICINE CLINIC | Facility: CLINIC | Age: 83
End: 2018-11-21

## 2018-11-21 NOTE — TELEPHONE ENCOUNTER
Lucia seeing patient today and requesting last office visit notes, hx and med list. Faxed to [phone].

## 2018-11-22 NOTE — TELEPHONE ENCOUNTER
Discussion/Summary   normal x-ray results.        Verified Results  XR ANKLE LT MIN 3V 08Nov2017 03:42PM CARRIE RANGEL     Test Name Result Flag Reference   XR ANKLE LT MIN 3V (Report)     Accession #    [id number]    EXAM: XR ANKLE LT MIN 3V    CLINICAL INDICATION: Ankle pain after injury    COMPARISON: None.    FINDINGS: No fracture dislocation    IMPRESSION:    No fracture    **** F I N A L ****    Transcribed By: SIMONE   11/09/17 8:12 am    Dictated By:      ERIC, FELIX CALDERON    Electronically Reviewed and Approved By:      FELIX REYES MD 11/09/17 8:13 am        Spoke with pt today for TCM. Pt discharged to his senior living facility ([facility]) as of 08/10/18. Pt states he is having pain on his right side and requesting a prescription for hydrocodone-acteminophen 5-325mg.

## 2018-11-23 DIAGNOSIS — F41.1 GENERALIZED ANXIETY DISORDER: ICD-10-CM

## 2018-11-23 RX ORDER — ALENDRONATE SODIUM 70 MG/1
TABLET ORAL
Qty: 12 TABLET | Refills: 2 | Status: SHIPPED | OUTPATIENT
Start: 2018-11-23 | End: 2019-05-01

## 2018-11-23 RX ORDER — LORATADINE 10 MG/1
10 TABLET ORAL DAILY
Qty: 28 TABLET | Refills: 2 | Status: ON HOLD | OUTPATIENT
Start: 2018-11-23 | End: 2021-03-03

## 2018-11-23 RX ORDER — ALPRAZOLAM 0.25 MG/1
TABLET ORAL
Qty: 120 TABLET | Refills: 0 | Status: ON HOLD | OUTPATIENT
Start: 2018-11-23 | End: 2021-03-10

## 2018-11-23 RX ORDER — TRAZODONE HYDROCHLORIDE 50 MG/1
TABLET ORAL
Qty: 90 TABLET | Refills: 2 | Status: SHIPPED | OUTPATIENT
Start: 2018-11-23 | End: 2019-05-01

## 2018-11-23 RX ORDER — BICALUTAMIDE 50 MG/1
50 TABLET, FILM COATED ORAL
Qty: 90 TABLET | Refills: 2 | Status: SHIPPED | OUTPATIENT
Start: 2018-11-23 | End: 2019-05-01 | Stop reason: ALTCHOICE

## 2018-11-23 NOTE — TELEPHONE ENCOUNTER
Last ov 8/7/18  Last refill bicalutamide 11/1/18  Last refill alendronate 9/10/18  Last refill alprazolam 8/7/18  Last refill acetaminophen 500  Last refill loratadine   Last refill trazodone 8/16/18

## 2018-11-28 RX ORDER — NITROFURANTOIN 25; 75 MG/1; MG/1
100 CAPSULE ORAL 2 TIMES DAILY
Qty: 20 CAPSULE | Refills: 0 | Status: SHIPPED | OUTPATIENT
Start: 2018-11-28 | End: 2018-12-08

## 2018-12-31 RX ORDER — SERTRALINE HYDROCHLORIDE 100 MG/1
TABLET, FILM COATED ORAL
Qty: 90 TABLET | Refills: 0 | Status: CANCELLED | OUTPATIENT
Start: 2018-12-31

## 2018-12-31 NOTE — TELEPHONE ENCOUNTER
Last ov was 8/7/18  Last refill sertraline 9/27/18    . Pt is due for a f/u on depression medication.  Please call to schedule one

## 2019-01-02 NOTE — TELEPHONE ENCOUNTER
Rx Request  bicalutamide (CASODEX) 50 mg PO tablet    Disp:       90             R: 0     Last Visit: 08/07/2018      Last Refilled: 11/23/2018

## 2019-01-03 RX ORDER — BICALUTAMIDE 50 MG/1
TABLET, FILM COATED ORAL
Qty: 90 TABLET | Refills: 0 | Status: ON HOLD | OUTPATIENT
Start: 2019-01-03 | End: 2021-03-10

## 2019-01-08 RX ORDER — SERTRALINE HYDROCHLORIDE 100 MG/1
100 TABLET, FILM COATED ORAL
Qty: 90 TABLET | Refills: 0 | Status: CANCELLED | OUTPATIENT
Start: 2019-01-08

## 2019-01-08 NOTE — TELEPHONE ENCOUNTER
Last ov 8/7/18  Last refill sertraline 9/27/18      Pt is due for a f/u on medications.  Please call to schedule one

## 2019-01-11 RX ORDER — SERTRALINE HYDROCHLORIDE 100 MG/1
100 TABLET, FILM COATED ORAL
Qty: 90 TABLET | Refills: 0 | Status: ON HOLD | OUTPATIENT
Start: 2019-01-11 | End: 2021-03-03

## 2019-01-11 NOTE — TELEPHONE ENCOUNTER
Last ov 8/7/18  Last refill sertraline 9/27/18    Pt is due for a f/u on mediations.  Please call to schedule one

## 2019-02-04 RX ORDER — ALENDRONATE SODIUM 70 MG/1
TABLET ORAL
Qty: 12 TABLET | Refills: 0 | Status: SHIPPED | OUTPATIENT
Start: 2019-02-04 | End: 2019-10-07

## 2019-02-15 DIAGNOSIS — F41.1 GENERALIZED ANXIETY DISORDER: ICD-10-CM

## 2019-02-15 RX ORDER — FAMOTIDINE 20 MG/1
TABLET ORAL
Qty: 180 TABLET | Refills: 0 | Status: SHIPPED | OUTPATIENT
Start: 2019-02-15 | End: 2019-05-17

## 2019-02-15 RX ORDER — TRAZODONE HYDROCHLORIDE 50 MG/1
TABLET ORAL
Qty: 90 TABLET | Refills: 0 | Status: SHIPPED | OUTPATIENT
Start: 2019-02-15 | End: 2019-05-17

## 2019-05-01 NOTE — PROGRESS NOTES
Patient presents with his brother needs a review and filling out forms for obtaining activities of daily living support. Patient has multiple medical and psychiatric issues.   Today's exam was unremarkable he is neatly dressed alert aware appropriate his i

## 2019-05-17 DIAGNOSIS — F41.1 GENERALIZED ANXIETY DISORDER: ICD-10-CM

## 2019-05-18 NOTE — TELEPHONE ENCOUNTER
Am reluctant to refill trazedone    It's a drug class better offf not used with seniors          keily

## 2019-05-20 RX ORDER — TRAZODONE HYDROCHLORIDE 50 MG/1
TABLET ORAL
Qty: 90 TABLET | Refills: 0 | Status: ON HOLD | OUTPATIENT
Start: 2019-05-20 | End: 2021-03-10

## 2019-05-20 RX ORDER — FAMOTIDINE 20 MG/1
TABLET ORAL
Qty: 180 TABLET | Refills: 0 | Status: ON HOLD | OUTPATIENT
Start: 2019-05-20 | End: 2021-03-10

## 2019-06-17 PROCEDURE — 87086 URINE CULTURE/COLONY COUNT: CPT | Performed by: PHYSICIAN ASSISTANT

## 2019-06-17 PROCEDURE — 87088 URINE BACTERIA CULTURE: CPT | Performed by: PHYSICIAN ASSISTANT

## 2019-06-17 PROCEDURE — 87186 SC STD MICRODIL/AGAR DIL: CPT | Performed by: PHYSICIAN ASSISTANT

## 2019-07-05 RX ORDER — LISINOPRIL 5 MG/1
TABLET ORAL
Qty: 90 TABLET | Refills: 0 | Status: SHIPPED | OUTPATIENT
Start: 2019-07-05 | End: 2019-10-03

## 2019-10-03 ENCOUNTER — TELEPHONE (OUTPATIENT)
Dept: FAMILY MEDICINE CLINIC | Facility: CLINIC | Age: 84
End: 2019-10-03

## 2019-10-03 RX ORDER — LISINOPRIL 5 MG/1
TABLET ORAL
Qty: 90 TABLET | Refills: 0 | Status: SHIPPED | OUTPATIENT
Start: 2019-10-03 | End: 2020-01-04

## 2019-10-03 NOTE — TELEPHONE ENCOUNTER
UPDATE-  Script needs to be sent to    Rhonda Hernandez [address] [place] [address] - [address]   [phone], [phone]

## 2019-10-07 RX ORDER — ALENDRONATE SODIUM 70 MG/1
TABLET ORAL
Qty: 4 TABLET | Refills: 4 | Status: SHIPPED | OUTPATIENT
Start: 2019-10-07 | End: 2020-02-28

## 2020-01-04 RX ORDER — LISINOPRIL 5 MG/1
TABLET ORAL
Qty: 30 TABLET | Refills: 4 | Status: SHIPPED | OUTPATIENT
Start: 2020-01-04 | End: 2020-06-05

## 2020-02-28 RX ORDER — ALENDRONATE SODIUM 70 MG/1
TABLET ORAL
Qty: 4 TABLET | Refills: 3 | Status: SHIPPED | OUTPATIENT
Start: 2020-02-28 | End: 2020-06-25

## 2020-05-27 ENCOUNTER — TELEPHONE (OUTPATIENT)
Dept: FAMILY MEDICINE CLINIC | Facility: CLINIC | Age: 85
End: 2020-05-27

## 2020-05-27 RX ORDER — LISINOPRIL 5 MG/1
TABLET ORAL
Qty: 30 TABLET | Refills: 4 | OUTPATIENT
Start: 2020-05-27

## 2020-05-27 NOTE — TELEPHONE ENCOUNTER
Spoke to pt informed him he will need a visit no visit since 5/2019. Pt states he takes Norco for back pain. Colby Fleming Also I do not see we have filled Floydene Smyth recently. Pt refusing OV. Phone visit scheduled.

## 2020-06-05 ENCOUNTER — VIRTUAL PHONE E/M (OUTPATIENT)
Dept: FAMILY MEDICINE CLINIC | Facility: CLINIC | Age: 85
End: 2020-06-05
Payer: MEDICARE

## 2020-06-05 DIAGNOSIS — E46 PROTEIN-CALORIE MALNUTRITION, UNSPECIFIED SEVERITY (HCC): ICD-10-CM

## 2020-06-05 DIAGNOSIS — I10 ESSENTIAL HYPERTENSION: Primary | ICD-10-CM

## 2020-06-05 PROCEDURE — 99443 PHONE E/M BY PHYS 21-30 MIN: CPT | Performed by: FAMILY MEDICINE

## 2020-06-05 RX ORDER — LISINOPRIL 5 MG/1
5 TABLET ORAL DAILY
Qty: 90 TABLET | Refills: 3 | Status: ON HOLD | OUTPATIENT
Start: 2020-06-05 | End: 2021-02-28

## 2020-06-05 NOTE — PROGRESS NOTES
HPI:    Patient ID: Delicia Santiago is a 80year old male. Joe Ramos presents for a refill of his lisinopril 5 mg. He takes this for hypertension management. He and his lady friend are living or sharing in a residence nearby in [place].   He is total) by mouth 2 (two) times daily. 56 capsule 2   • fluconazole 150 MG Oral Tab Take 1 tablet (150 mg total) by mouth daily. 1 tablet 0   • lisinopril 5 MG Oral Tab Take 5 mg by mouth daily.      • docusate sodium 100 MG Oral Cap Take 100 mg by mouth 2 (t Referrals:  None       PN#[id number]

## 2020-06-16 NOTE — PROGRESS NOTES
Virtual Telephone Check-In    Cydney Diego verbally consents to a Virtual/Telephone Check-In visit on 06/05/20. Patient has been referred to the [facility] website at [email or web address] to review the yearly Consent to Treat document.     Patient leah

## 2020-06-25 RX ORDER — ALENDRONATE SODIUM 70 MG/1
TABLET ORAL
Qty: 4 TABLET | Refills: 4 | Status: ON HOLD | OUTPATIENT
Start: 2020-06-25 | End: 2021-03-10

## 2020-09-24 ENCOUNTER — HOSPITAL ENCOUNTER (EMERGENCY)
Age: 85
Discharge: HOME | End: 2020-09-24
Attending: EMERGENCY MEDICINE
Payer: MEDICARE

## 2020-09-24 VITALS
DIASTOLIC BLOOD PRESSURE: 80 MMHG | HEART RATE: 88 BPM | TEMPERATURE: 99 F | SYSTOLIC BLOOD PRESSURE: 132 MMHG | RESPIRATION RATE: 20 BRPM | BODY MASS INDEX: 24 KG/M2 | OXYGEN SATURATION: 93 % | WEIGHT: 150 LBS

## 2020-09-24 DIAGNOSIS — R63.0 POOR APPETITE: Primary | ICD-10-CM

## 2020-09-24 PROCEDURE — 99283 EMERGENCY DEPT VISIT LOW MDM: CPT

## 2020-09-24 NOTE — ED PROVIDER NOTES
Patient Seen in: [facility] Emergency Department [place]      History   Patient presents with:  Constipation    Stated Complaint: constipation x 5 days    HPI    This is a pleasant 44-year-old male who presents to the emergency department complaining o Performed by David Gross MD at [facility] ENDOSCOPY   • GASTROSCOPY N/A 12/21/2013    Performed by Ben Honeycutt DO at [address] [place] [address] ENDOSCOPY   • GASTROSCOPY N/A 12/19/2013    Performed by Hugo Gomez MD at [address] [place] [address] Physical Exam  General: This a pleasant nontoxic appearing patient in no apparent distress alert and oriented ×3  HEENT: Pupils are equal reactive to light. Extra ocular motions are intact. No scleral icterus or conjunctival pallor.   Skin is pale primary care physician to have continued work-up as an outpatient. He was discharged good condition. I answered all of his questions and he was comfortable with the plan.               Disposition and Plan     Clinical Impression:  Poor appetite  (primary

## 2020-11-19 RX ORDER — ALENDRONATE SODIUM 70 MG/1
TABLET ORAL
Qty: 4 TABLET | Refills: 4 | OUTPATIENT
Start: 2020-11-19

## 2020-12-28 ENCOUNTER — APPOINTMENT (OUTPATIENT)
Dept: CT IMAGING | Facility: HOSPITAL | Age: 85
End: 2020-12-28
Attending: EMERGENCY MEDICINE
Payer: MEDICARE

## 2020-12-28 ENCOUNTER — HOSPITAL ENCOUNTER (EMERGENCY)
Facility: HOSPITAL | Age: 85
Discharge: HOME | End: 2020-12-28
Attending: EMERGENCY MEDICINE
Payer: MEDICARE

## 2020-12-28 ENCOUNTER — APPOINTMENT (OUTPATIENT)
Dept: GENERAL RADIOLOGY | Facility: HOSPITAL | Age: 85
End: 2020-12-28
Attending: EMERGENCY MEDICINE
Payer: MEDICARE

## 2020-12-28 VITALS
OXYGEN SATURATION: 95 % | WEIGHT: 150 LBS | BODY MASS INDEX: 22.22 KG/M2 | DIASTOLIC BLOOD PRESSURE: 76 MMHG | RESPIRATION RATE: 19 BRPM | HEIGHT: 69 IN | TEMPERATURE: 98 F | SYSTOLIC BLOOD PRESSURE: 130 MMHG | HEART RATE: 78 BPM

## 2020-12-28 DIAGNOSIS — Z20.822 ENCOUNTER FOR LABORATORY TESTING FOR COVID-19 VIRUS: ICD-10-CM

## 2020-12-28 DIAGNOSIS — R51.9 ACUTE NONINTRACTABLE HEADACHE, UNSPECIFIED HEADACHE TYPE: ICD-10-CM

## 2020-12-28 DIAGNOSIS — J18.9 COMMUNITY ACQUIRED PNEUMONIA OF RIGHT LOWER LOBE OF LUNG: Primary | ICD-10-CM

## 2020-12-28 PROCEDURE — 80053 COMPREHEN METABOLIC PANEL: CPT | Performed by: EMERGENCY MEDICINE

## 2020-12-28 PROCEDURE — 96374 THER/PROPH/DIAG INJ IV PUSH: CPT

## 2020-12-28 PROCEDURE — 93005 ELECTROCARDIOGRAM TRACING: CPT

## 2020-12-28 PROCEDURE — 87077 CULTURE AEROBIC IDENTIFY: CPT | Performed by: EMERGENCY MEDICINE

## 2020-12-28 PROCEDURE — 99285 EMERGENCY DEPT VISIT HI MDM: CPT

## 2020-12-28 PROCEDURE — 87186 SC STD MICRODIL/AGAR DIL: CPT | Performed by: EMERGENCY MEDICINE

## 2020-12-28 PROCEDURE — 87086 URINE CULTURE/COLONY COUNT: CPT | Performed by: EMERGENCY MEDICINE

## 2020-12-28 PROCEDURE — 70450 CT HEAD/BRAIN W/O DYE: CPT | Performed by: EMERGENCY MEDICINE

## 2020-12-28 PROCEDURE — 85025 COMPLETE CBC W/AUTO DIFF WBC: CPT | Performed by: EMERGENCY MEDICINE

## 2020-12-28 PROCEDURE — 96361 HYDRATE IV INFUSION ADD-ON: CPT

## 2020-12-28 PROCEDURE — 81001 URINALYSIS AUTO W/SCOPE: CPT | Performed by: EMERGENCY MEDICINE

## 2020-12-28 PROCEDURE — 71045 X-RAY EXAM CHEST 1 VIEW: CPT | Performed by: EMERGENCY MEDICINE

## 2020-12-28 PROCEDURE — 93010 ELECTROCARDIOGRAM REPORT: CPT

## 2020-12-28 PROCEDURE — 84484 ASSAY OF TROPONIN QUANT: CPT | Performed by: EMERGENCY MEDICINE

## 2020-12-28 RX ORDER — DOXYCYCLINE HYCLATE 100 MG/1
100 CAPSULE ORAL 2 TIMES DAILY
Qty: 20 CAPSULE | Refills: 0 | Status: SHIPPED | OUTPATIENT
Start: 2020-12-28 | End: 2021-01-07

## 2020-12-28 RX ORDER — SODIUM CHLORIDE 9 MG/ML
INJECTION, SOLUTION INTRAVENOUS CONTINUOUS
Status: DISCONTINUED | OUTPATIENT
Start: 2020-12-28 | End: 2020-12-28

## 2020-12-28 RX ORDER — ACETAMINOPHEN 500 MG
1000 TABLET ORAL ONCE
Status: COMPLETED | OUTPATIENT
Start: 2020-12-28 | End: 2020-12-28

## 2020-12-28 RX ORDER — DOXYCYCLINE HYCLATE 100 MG/1
100 CAPSULE ORAL EVERY 12 HOURS SCHEDULED
Status: DISCONTINUED | OUTPATIENT
Start: 2020-12-28 | End: 2020-12-28

## 2020-12-28 RX ORDER — MIRTAZAPINE 30 MG/1
30 TABLET, FILM COATED ORAL NIGHTLY
COMMUNITY

## 2020-12-28 RX ORDER — ONDANSETRON 2 MG/ML
4 INJECTION INTRAMUSCULAR; INTRAVENOUS ONCE
Status: COMPLETED | OUTPATIENT
Start: 2020-12-28 | End: 2020-12-28

## 2020-12-28 NOTE — ED PROVIDER NOTES
Patient Seen in: [facility] Emergency Department      History   Patient presents with:  Fatigue    Stated Complaint: fatigue     HPI    80-year-old male lives in a nursing home center in an independent living situation and presents to the emergency de GASTROSCOPY N/A 12/19/2013    Performed by Ashia Negro MD at [facility] ENDOSCOPY   • HERNIA SURGERY     • HIP TOTAL REPLACEMENT Right 7/9/2018    Performed by Carlos Do MD at [facility] MAIN OR   • LUMBAR FACET INJECTION Bilateral 4/21/2014    Performed by atraumatic. Anicteric sclera. Oral mucosa is moist.  Pupils equal round reactive to light. Extraocular movements are intact. Neck: Nontender without adenopathy or thyromegaly. No meningeal signs. Lungs are clear to auscultation.   Heart exam: Normal S intervals and axes as noted on EKG Report. Rate: 73  Rhythm: Sinus Rhythm  Reading: Premature supraventricular complexes. Left axis deviation. Pulmonary disease pattern. Septal and inferior infarct, age undetermined. Abnormal EKG.   Agree with EKG repo by Technologist)  PT TO ED FROM [facility] FOR DECREASE APPETITE X2 DAY, FATIGUE,  HYPERTENSION. + NAUSEA, DENIES DIARRHEA AND FEVER, DENIES CP OR SOB     FINDINGS:  Cardiac size and pulmonary vasculature are within normal limits. No pleural effusions.

## 2020-12-28 NOTE — ED INITIAL ASSESSMENT (HPI)
PT TO ED FROM [facility] FOR DECREASE APPETITE X2 DAY, FATIGUE, HYPERTENSION. + NAUSEA, DENIES DIARRHEA AND FEVER, DENIES CP OR SOB

## 2020-12-29 NOTE — ED NOTES
Kelsey from [facility] aware PT is on his way home.  Kelsey will be waiting to assist medics upon PT arrival

## 2020-12-29 NOTE — ED NOTES
SAMANTHA FROM [facility] CALLED REGARDING GAINING ACCESS TO HIS APARTMENT BT MEDICS FOR RETURN. SAMANTHA WILL BE AT FACILITY UNTIL 8:00 PM AND WILL ASSIST MEDICS UPON RETURN.

## 2021-01-03 RX ORDER — NITROFURANTOIN 25; 75 MG/1; MG/1
100 CAPSULE ORAL 2 TIMES DAILY
Qty: 20 CAPSULE | Refills: 0 | Status: SHIPPED | OUTPATIENT
Start: 2021-01-03 | End: 2021-01-13

## 2021-01-03 NOTE — ED NOTES
Spoke with patient regarding medication changes and adding macrobid to his antibiotic regimen. Patient verbalizes understanding; however states he has no way to get his medications to him.  I attempted to contact Ai ([phone]) [place]

## 2021-02-26 ENCOUNTER — APPOINTMENT (OUTPATIENT)
Dept: GENERAL RADIOLOGY | Facility: HOSPITAL | Age: 86
DRG: 699 | End: 2021-02-26
Attending: EMERGENCY MEDICINE
Payer: MEDICARE

## 2021-02-26 ENCOUNTER — HOSPITAL ENCOUNTER (EMERGENCY)
Facility: HOSPITAL | Age: 86
Discharge: HOME | DRG: 699 | End: 2021-02-26
Attending: EMERGENCY MEDICINE
Payer: MEDICARE

## 2021-02-26 VITALS
HEIGHT: 70 IN | HEART RATE: 74 BPM | TEMPERATURE: 98 F | OXYGEN SATURATION: 95 % | SYSTOLIC BLOOD PRESSURE: 101 MMHG | RESPIRATION RATE: 18 BRPM | DIASTOLIC BLOOD PRESSURE: 68 MMHG | BODY MASS INDEX: 21.47 KG/M2 | WEIGHT: 149.94 LBS

## 2021-02-26 DIAGNOSIS — R07.89 CHEST PAIN, ATYPICAL: Primary | ICD-10-CM

## 2021-02-26 LAB
ALBUMIN SERPL-MCNC: 3.2 G/DL (ref 3.4–5)
ALBUMIN/GLOB SERPL: 0.9 {RATIO} (ref 1–2)
ALP LIVER SERPL-CCNC: 100 U/L
ALT SERPL-CCNC: 14 U/L
ANION GAP SERPL CALC-SCNC: 9 MMOL/L (ref 0–18)
APTT PPP: 38.5 SECONDS (ref 25.4–36.1)
AST SERPL-CCNC: 15 U/L (ref 15–37)
ATRIAL RATE: 65 BPM
BASOPHILS # BLD AUTO: 0.02 X10(3) UL (ref 0–0.2)
BASOPHILS NFR BLD AUTO: 0.3 %
BILIRUB SERPL-MCNC: 0.9 MG/DL (ref 0.1–2)
BUN BLD-MCNC: 14 MG/DL (ref 7–18)
BUN/CREAT SERPL: 13.3 (ref 10–20)
CALCIUM BLD-MCNC: 8.5 MG/DL (ref 8.5–10.1)
CHLORIDE SERPL-SCNC: 94 MMOL/L (ref 98–112)
CO2 SERPL-SCNC: 27 MMOL/L (ref 21–32)
CREAT BLD-MCNC: 1.05 MG/DL
DEPRECATED RDW RBC AUTO: 43.7 FL (ref 35.1–46.3)
EOSINOPHIL # BLD AUTO: 0.01 X10(3) UL (ref 0–0.7)
EOSINOPHIL NFR BLD AUTO: 0.1 %
ERYTHROCYTE [DISTWIDTH] IN BLOOD BY AUTOMATED COUNT: 13.3 % (ref 11–15)
GLOBULIN PLAS-MCNC: 3.4 G/DL (ref 2.8–4.4)
GLUCOSE BLD-MCNC: 109 MG/DL (ref 70–99)
HCT VFR BLD AUTO: 35.1 %
HGB BLD-MCNC: 11.9 G/DL
IMM GRANULOCYTES # BLD AUTO: 0.05 X10(3) UL (ref 0–1)
IMM GRANULOCYTES NFR BLD: 0.7 %
INR BLD: 0.97 (ref 0.89–1.11)
LYMPHOCYTES # BLD AUTO: 0.51 X10(3) UL (ref 1–4)
LYMPHOCYTES NFR BLD AUTO: 7.3 %
M PROTEIN MFR SERPL ELPH: 6.6 G/DL (ref 6.4–8.2)
MCH RBC QN AUTO: 30.6 PG (ref 26–34)
MCHC RBC AUTO-ENTMCNC: 33.9 G/DL (ref 31–37)
MCV RBC AUTO: 90.2 FL
MONOCYTES # BLD AUTO: 0.48 X10(3) UL (ref 0.1–1)
MONOCYTES NFR BLD AUTO: 6.8 %
NEUTROPHILS # BLD AUTO: 5.94 X10 (3) UL (ref 1.5–7.7)
NEUTROPHILS # BLD AUTO: 5.94 X10(3) UL (ref 1.5–7.7)
NEUTROPHILS NFR BLD AUTO: 84.8 %
OSMOLALITY SERPL CALC.SUM OF ELEC: 271 MOSM/KG (ref 275–295)
P AXIS: 37 DEGREES
P-R INTERVAL: 176 MS
PLATELET # BLD AUTO: 197 10(3)UL (ref 150–450)
POTASSIUM SERPL-SCNC: 3.9 MMOL/L (ref 3.5–5.1)
PROCALCITONIN SERPL-MCNC: <0.05 NG/ML (ref ?–0.16)
PSA SERPL DL<=0.01 NG/ML-MCNC: 13.2 SECONDS (ref 12.4–14.6)
Q-T INTERVAL: 426 MS
QRS DURATION: 94 MS
QTC CALCULATION (BEZET): 443 MS
R AXIS: -70 DEGREES
RBC # BLD AUTO: 3.89 X10(6)UL
SODIUM SERPL-SCNC: 130 MMOL/L (ref 136–145)
T AXIS: 20 DEGREES
TROPONIN I SERPL-MCNC: <0.045 NG/ML (ref ?–0.04)
TROPONIN I SERPL-MCNC: <0.045 NG/ML (ref ?–0.04)
VENTRICULAR RATE: 65 BPM
WBC # BLD AUTO: 7 X10(3) UL (ref 4–11)

## 2021-02-26 PROCEDURE — 85730 THROMBOPLASTIN TIME PARTIAL: CPT | Performed by: EMERGENCY MEDICINE

## 2021-02-26 PROCEDURE — 85025 COMPLETE CBC W/AUTO DIFF WBC: CPT | Performed by: EMERGENCY MEDICINE

## 2021-02-26 PROCEDURE — 85610 PROTHROMBIN TIME: CPT | Performed by: EMERGENCY MEDICINE

## 2021-02-26 PROCEDURE — 99285 EMERGENCY DEPT VISIT HI MDM: CPT

## 2021-02-26 PROCEDURE — 96375 TX/PRO/DX INJ NEW DRUG ADDON: CPT

## 2021-02-26 PROCEDURE — 84145 PROCALCITONIN (PCT): CPT | Performed by: EMERGENCY MEDICINE

## 2021-02-26 PROCEDURE — 71101 X-RAY EXAM UNILAT RIBS/CHEST: CPT | Performed by: EMERGENCY MEDICINE

## 2021-02-26 PROCEDURE — 80053 COMPREHEN METABOLIC PANEL: CPT | Performed by: EMERGENCY MEDICINE

## 2021-02-26 PROCEDURE — 93005 ELECTROCARDIOGRAM TRACING: CPT

## 2021-02-26 PROCEDURE — 84484 ASSAY OF TROPONIN QUANT: CPT | Performed by: EMERGENCY MEDICINE

## 2021-02-26 PROCEDURE — 93010 ELECTROCARDIOGRAM REPORT: CPT

## 2021-02-26 PROCEDURE — 96374 THER/PROPH/DIAG INJ IV PUSH: CPT

## 2021-02-26 RX ORDER — ONDANSETRON 2 MG/ML
4 INJECTION INTRAMUSCULAR; INTRAVENOUS ONCE
Status: COMPLETED | OUTPATIENT
Start: 2021-02-26 | End: 2021-02-26

## 2021-02-26 RX ORDER — MORPHINE SULFATE 4 MG/ML
4 INJECTION, SOLUTION INTRAMUSCULAR; INTRAVENOUS EVERY 30 MIN PRN
Status: DISCONTINUED | OUTPATIENT
Start: 2021-02-26 | End: 2021-02-26

## 2021-02-26 NOTE — ED NOTES
Assisted patient with dressing and ambulating to bathroom. Patient placed in wheelchair in [place] to wait for ride. Patient has his wallet and keys in his pockets.

## 2021-02-26 NOTE — ED NOTES
Round on patient. Patient reports severe back pain. Repositioned patient; updated with POC. Spoke with ERMD, new orders received.

## 2021-02-26 NOTE — ED INITIAL ASSESSMENT (HPI)
Pt presents via EMS from nursing home for left shoulder pain that began yesterday. Pt states has been seen for same in the past. Pt denies injury.

## 2021-02-26 NOTE — ED PROVIDER NOTES
Patient Seen in: [facility] Emergency Department      History   Patient presents with:  Pain    Stated Complaint: Arm Pain    HPI/Subjective:   HPI    Patient presents with left chest pain.   The patient reported to EMS that he had been having left s ESOPHAGOGASTRODUODENOSCOPY (EGD) N/A 12/30/2013    Performed by Jorge Radford MD at [facility] ENDOSCOPY   • [address]     • GASTROSCOPY N/A 12/23/2013    Performed by Jorge Radford MD at [facility] ENDOSCOPY   • GASTROSCOPY N/A 12/21/201 None (Room air)       Current:/68   Pulse 74   Temp 97.5 °F (36.4 °C) (Temporal)   Resp 18   Ht 177.8 cm (5' 10\")   Wt 68 kg   SpO2 95%   BMI 21.51 kg/m²         Physical Exam    General: Alert and oriented, in no acute distress.   HEENT: Normocephal Final result                 Please view results for these tests on the individual orders. RAINBOW DRAW BLUE   RAINBOW DRAW LAVENDER   RAINBOW DRAW LIGHT GREEN   RAINBOW DRAW GOLD     EKG    Rate, intervals and axes as noted on EKG Report.   Rate: 6 troponin tests which is also reassuring. He is very anxious to be discharged. He was encouraged to follow-up with his doctor or return for new or worsening symptoms.                Disposition and Plan     Clinical Impression:  Chest pain, atypical  (prim

## 2021-02-26 NOTE — ED NOTES
Patient presses call light again; requesting update on POC. Patient educated that it's been 15 minutes since last labs were drawn and results can take up to an hour, just as they did with previous lab draw.

## 2021-02-28 ENCOUNTER — HOSPITAL ENCOUNTER (INPATIENT)
Facility: HOSPITAL | Age: 86
LOS: 4 days | Discharge: NURSING, REHAB OR LONG-TERM CARE | DRG: 699 | End: 2021-03-04
Attending: EMERGENCY MEDICINE | Admitting: INTERNAL MEDICINE
Payer: MEDICARE

## 2021-02-28 ENCOUNTER — APPOINTMENT (OUTPATIENT)
Dept: CT IMAGING | Facility: HOSPITAL | Age: 86
DRG: 699 | End: 2021-02-28
Attending: EMERGENCY MEDICINE
Payer: MEDICARE

## 2021-02-28 ENCOUNTER — APPOINTMENT (OUTPATIENT)
Dept: GENERAL RADIOLOGY | Facility: HOSPITAL | Age: 86
DRG: 699 | End: 2021-02-28
Attending: EMERGENCY MEDICINE
Payer: MEDICARE

## 2021-02-28 DIAGNOSIS — N30.00 ACUTE CYSTITIS WITHOUT HEMATURIA: Primary | ICD-10-CM

## 2021-02-28 DIAGNOSIS — E87.1 HYPONATREMIA: ICD-10-CM

## 2021-02-28 PROBLEM — R10.12 LEFT UPPER QUADRANT ABDOMINAL PAIN: Status: ACTIVE | Noted: 2021-02-28

## 2021-02-28 LAB
ALBUMIN SERPL-MCNC: 3 G/DL (ref 3.4–5)
ALBUMIN/GLOB SERPL: 0.8 {RATIO} (ref 1–2)
ALP LIVER SERPL-CCNC: 98 U/L
ALT SERPL-CCNC: 20 U/L
ANION GAP SERPL CALC-SCNC: 10 MMOL/L (ref 0–18)
ANION GAP SERPL CALC-SCNC: 10 MMOL/L (ref 0–18)
AST SERPL-CCNC: 24 U/L (ref 15–37)
ATRIAL RATE: 89 BPM
BASOPHILS # BLD AUTO: 0.01 X10(3) UL (ref 0–0.2)
BASOPHILS NFR BLD AUTO: 0.1 %
BILIRUB SERPL-MCNC: 0.8 MG/DL (ref 0.1–2)
BILIRUB UR QL STRIP.AUTO: NEGATIVE
BUN BLD-MCNC: 22 MG/DL (ref 7–18)
BUN BLD-MCNC: 24 MG/DL (ref 7–18)
BUN/CREAT SERPL: 22 (ref 10–20)
BUN/CREAT SERPL: 22 (ref 10–20)
CALCIUM BLD-MCNC: 8.8 MG/DL (ref 8.5–10.1)
CALCIUM BLD-MCNC: 9.1 MG/DL (ref 8.5–10.1)
CHLORIDE SERPL-SCNC: 89 MMOL/L (ref 98–112)
CHLORIDE SERPL-SCNC: 91 MMOL/L (ref 98–112)
CO2 SERPL-SCNC: 22 MMOL/L (ref 21–32)
CO2 SERPL-SCNC: 24 MMOL/L (ref 21–32)
COLOR UR AUTO: YELLOW
CREAT BLD-MCNC: 1 MG/DL
CREAT BLD-MCNC: 1.09 MG/DL
DEPRECATED RDW RBC AUTO: 41.9 FL (ref 35.1–46.3)
EOSINOPHIL # BLD AUTO: 0.01 X10(3) UL (ref 0–0.7)
EOSINOPHIL NFR BLD AUTO: 0.1 %
ERYTHROCYTE [DISTWIDTH] IN BLOOD BY AUTOMATED COUNT: 13.2 % (ref 11–15)
GLOBULIN PLAS-MCNC: 3.7 G/DL (ref 2.8–4.4)
GLUCOSE BLD-MCNC: 101 MG/DL (ref 70–99)
GLUCOSE BLD-MCNC: 110 MG/DL (ref 70–99)
GLUCOSE UR STRIP.AUTO-MCNC: NEGATIVE MG/DL
HCT VFR BLD AUTO: 33.8 %
HGB BLD-MCNC: 12 G/DL
HYALINE CASTS #/AREA URNS AUTO: PRESENT /LPF
IMM GRANULOCYTES # BLD AUTO: 0.07 X10(3) UL (ref 0–1)
IMM GRANULOCYTES NFR BLD: 0.7 %
KETONES UR STRIP.AUTO-MCNC: 20 MG/DL
LYMPHOCYTES # BLD AUTO: 0.82 X10(3) UL (ref 1–4)
LYMPHOCYTES NFR BLD AUTO: 8.6 %
M PROTEIN MFR SERPL ELPH: 6.7 G/DL (ref 6.4–8.2)
MCH RBC QN AUTO: 31.3 PG (ref 26–34)
MCHC RBC AUTO-ENTMCNC: 35.5 G/DL (ref 31–37)
MCV RBC AUTO: 88 FL
MONOCYTES # BLD AUTO: 0.6 X10(3) UL (ref 0.1–1)
MONOCYTES NFR BLD AUTO: 6.3 %
NEUTROPHILS # BLD AUTO: 8.02 X10 (3) UL (ref 1.5–7.7)
NEUTROPHILS # BLD AUTO: 8.02 X10(3) UL (ref 1.5–7.7)
NEUTROPHILS NFR BLD AUTO: 84.2 %
NITRITE UR QL STRIP.AUTO: POSITIVE
OSMOLALITY SERPL CALC.SUM OF ELEC: 259 MOSM/KG (ref 275–295)
OSMOLALITY SERPL CALC.SUM OF ELEC: 261 MOSM/KG (ref 275–295)
P AXIS: 32 DEGREES
P-R INTERVAL: 164 MS
PH UR STRIP.AUTO: 6 [PH] (ref 5–8)
PLATELET # BLD AUTO: 222 10(3)UL (ref 150–450)
POTASSIUM SERPL-SCNC: 4.1 MMOL/L (ref 3.5–5.1)
POTASSIUM SERPL-SCNC: 4.3 MMOL/L (ref 3.5–5.1)
PROT UR STRIP.AUTO-MCNC: 30 MG/DL
Q-T INTERVAL: 384 MS
QRS DURATION: 92 MS
QTC CALCULATION (BEZET): 467 MS
R AXIS: -76 DEGREES
RBC # BLD AUTO: 3.84 X10(6)UL
SARS-COV-2 RNA RESP QL NAA+PROBE: NOT DETECTED
SODIUM SERPL-SCNC: 123 MMOL/L (ref 136–145)
SODIUM SERPL-SCNC: 123 MMOL/L (ref 136–145)
SP GR UR STRIP.AUTO: 1.02 (ref 1–1.03)
T AXIS: 29 DEGREES
TROPONIN I SERPL-MCNC: <0.045 NG/ML (ref ?–0.04)
UROBILINOGEN UR STRIP.AUTO-MCNC: <2 MG/DL
VENTRICULAR RATE: 89 BPM
WBC # BLD AUTO: 9.5 X10(3) UL (ref 4–11)

## 2021-02-28 PROCEDURE — 71045 X-RAY EXAM CHEST 1 VIEW: CPT | Performed by: EMERGENCY MEDICINE

## 2021-02-28 PROCEDURE — 99223 1ST HOSP IP/OBS HIGH 75: CPT | Performed by: INTERNAL MEDICINE

## 2021-02-28 PROCEDURE — 74177 CT ABD & PELVIS W/CONTRAST: CPT | Performed by: EMERGENCY MEDICINE

## 2021-02-28 PROCEDURE — 71275 CT ANGIOGRAPHY CHEST: CPT | Performed by: EMERGENCY MEDICINE

## 2021-02-28 RX ORDER — MIRTAZAPINE 15 MG/1
30 TABLET, FILM COATED ORAL NIGHTLY
Status: DISCONTINUED | OUTPATIENT
Start: 2021-02-28 | End: 2021-03-04

## 2021-02-28 RX ORDER — NITROFURANTOIN 25; 75 MG/1; MG/1
100 CAPSULE ORAL 2 TIMES DAILY
Qty: 20 CAPSULE | Refills: 0 | Status: SHIPPED | OUTPATIENT
Start: 2021-02-28 | End: 2021-03-03

## 2021-02-28 RX ORDER — LEVOFLOXACIN 5 MG/ML
750 INJECTION, SOLUTION INTRAVENOUS
Status: DISCONTINUED | OUTPATIENT
Start: 2021-03-02 | End: 2021-03-02 | Stop reason: ALTCHOICE

## 2021-02-28 RX ORDER — ONDANSETRON 2 MG/ML
4 INJECTION INTRAMUSCULAR; INTRAVENOUS EVERY 6 HOURS PRN
Status: DISCONTINUED | OUTPATIENT
Start: 2021-02-28 | End: 2021-03-04

## 2021-02-28 RX ORDER — OXYBUTYNIN CHLORIDE 10 MG/1
10 TABLET, EXTENDED RELEASE ORAL DAILY
Refills: 1 | Status: DISCONTINUED | OUTPATIENT
Start: 2021-02-28 | End: 2021-03-04

## 2021-02-28 RX ORDER — ESCITALOPRAM OXALATE 5 MG/1
5 TABLET ORAL NIGHTLY
Status: DISCONTINUED | OUTPATIENT
Start: 2021-02-28 | End: 2021-03-01

## 2021-02-28 RX ORDER — SERTRALINE HYDROCHLORIDE 100 MG/1
100 TABLET, FILM COATED ORAL
Status: DISCONTINUED | OUTPATIENT
Start: 2021-02-28 | End: 2021-03-01

## 2021-02-28 RX ORDER — TRAZODONE HYDROCHLORIDE 50 MG/1
50 TABLET ORAL NIGHTLY
Status: DISCONTINUED | OUTPATIENT
Start: 2021-02-28 | End: 2021-03-04

## 2021-02-28 RX ORDER — FLUTICASONE PROPIONATE 50 MCG
2 SPRAY, SUSPENSION (ML) NASAL DAILY
Status: DISCONTINUED | OUTPATIENT
Start: 2021-02-28 | End: 2021-02-28

## 2021-02-28 RX ORDER — ACETAMINOPHEN 325 MG/1
650 TABLET ORAL ONCE
Status: COMPLETED | OUTPATIENT
Start: 2021-02-28 | End: 2021-02-28

## 2021-02-28 RX ORDER — FLUCONAZOLE 100 MG/1
150 TABLET ORAL DAILY
Status: DISCONTINUED | OUTPATIENT
Start: 2021-02-28 | End: 2021-02-28

## 2021-02-28 RX ORDER — MELATONIN
325
Status: DISCONTINUED | OUTPATIENT
Start: 2021-03-01 | End: 2021-02-28

## 2021-02-28 RX ORDER — FAMOTIDINE 20 MG/1
20 TABLET ORAL NIGHTLY
Status: DISCONTINUED | OUTPATIENT
Start: 2021-02-28 | End: 2021-03-04

## 2021-02-28 RX ORDER — ENOXAPARIN SODIUM 100 MG/ML
40 INJECTION SUBCUTANEOUS NIGHTLY
Status: DISCONTINUED | OUTPATIENT
Start: 2021-02-28 | End: 2021-03-04

## 2021-02-28 RX ORDER — LEVOFLOXACIN 5 MG/ML
750 INJECTION, SOLUTION INTRAVENOUS ONCE
Status: COMPLETED | OUTPATIENT
Start: 2021-02-28 | End: 2021-02-28

## 2021-02-28 RX ORDER — ALPRAZOLAM 0.25 MG/1
0.25 TABLET ORAL 2 TIMES DAILY PRN
Status: DISCONTINUED | OUTPATIENT
Start: 2021-02-28 | End: 2021-03-04

## 2021-02-28 RX ORDER — MORPHINE SULFATE 4 MG/ML
4 INJECTION, SOLUTION INTRAMUSCULAR; INTRAVENOUS ONCE
Status: COMPLETED | OUTPATIENT
Start: 2021-02-28 | End: 2021-02-28

## 2021-02-28 RX ORDER — SODIUM CHLORIDE 9 MG/ML
INJECTION, SOLUTION INTRAVENOUS CONTINUOUS
Status: DISCONTINUED | OUTPATIENT
Start: 2021-02-28 | End: 2021-03-01

## 2021-02-28 RX ORDER — HYDROCODONE BITARTRATE AND ACETAMINOPHEN 5; 325 MG/1; MG/1
1 TABLET ORAL EVERY 4 HOURS PRN
Status: DISCONTINUED | OUTPATIENT
Start: 2021-02-28 | End: 2021-03-04

## 2021-02-28 RX ORDER — SODIUM CHLORIDE 9 MG/ML
INJECTION, SOLUTION INTRAVENOUS CONTINUOUS
Status: CANCELLED | OUTPATIENT
Start: 2021-02-28 | End: 2021-02-28

## 2021-02-28 RX ORDER — CETIRIZINE HYDROCHLORIDE 10 MG/1
10 TABLET ORAL DAILY
Refills: 0 | Status: DISCONTINUED | OUTPATIENT
Start: 2021-02-28 | End: 2021-02-28

## 2021-02-28 RX ORDER — LISINOPRIL 5 MG/1
5 TABLET ORAL DAILY
Status: DISCONTINUED | OUTPATIENT
Start: 2021-02-28 | End: 2021-03-04

## 2021-02-28 NOTE — H&P
[facility]                                                               History & Physical         Zaida Cleaves Patient Status:  Inpatient    12/15/1931 MRN [id number]   [facility] 3NW-A Attending Charlie Wheatley MD   [facility] Day 12/30/2013    Performed by Sharifa Lugo MD at [facility] ENDOSCOPY   • [address]     • GASTROSCOPY N/A 12/23/2013    Performed by Sharifa Lugo MD at [facility] ENDOSCOPY   • GASTROSCOPY N/A 12/21/2013    Performed by MAIRA Holm 4    •  lisinopril 5 MG Oral Tab, Take 1 tablet (5 mg total) by mouth daily. , Disp: 90 tablet, Rfl: 3    •  TRAZODONE HCL 50 MG Oral Tab, TAKE 1 TABLET BY MOUTH AT BEDTIME, Disp: 90 tablet, Rfl: 0    •  FAMOTIDINE 20 MG Oral Tab, TAKE 1 TABLET BY MOUTH TWI topically 2 (two) times daily. (Patient not taking: Reported on 12/28/2020 ), Disp: 56 g, Rfl: 0    •  Calcium Carbonate-Vitamin D (OYSTER SHELL CALCIUM/D) 500-200 MG-UNIT Oral Tab, Take 1 tablet by mouth daily.  (Patient not taking: Reported on 12/28/2020 Component Value Date    WBC 9.5 02/28/2021    HGB 12.0 02/28/2021    HCT 33.8 02/28/2021    .0 02/28/2021    CREATSERUM 1.09 02/28/2021    BUN 24 02/28/2021     02/28/2021    K 4.1 02/28/2021    CL 91 02/28/2021    CO2 22.0 02/28/2021    GLU injection of non-ionic intravenous contrast material. Multi-planar reformatted/3-D images were created to optimize visualization of vascular anatomy. Dose reduction   techniques were used.  Dose information is transmitted to the ACR ([address] degenerative disc disease. Compression deformity of the T5, T6, and T8 vertebral bodies. The T6 vertebral body fracture is chronic. The other vertebral body fractures are new since 2018.       VASCULATURE:  4 vessel arch with the left vertebral artery or chronic back pain–patient has a spinal cord stimulator placed previously. Patient has history of narcotic abuse mentioned previously.   Patient takes Leonce Philip at home which was continued      Quality:  · DVT Prophylaxis: SCD, subcutaneous Lovenox  · CODE stat

## 2021-02-28 NOTE — PROGRESS NOTES
[facility] Dosing Service  Antibiotic Dosing    Ayan Griffith is a 80year old for whom pharmacy is dosing levofloacin for treatment of  UTI. Allergies: is allergic to penicillins and keflex.     Vitals: BP 95/64 (BP Location: Right arm)   Pulse 1

## 2021-02-28 NOTE — ED NOTES
Per patient's friend, and emergency contact, patient does not have the remote for his neurostimulator.

## 2021-02-28 NOTE — CONSULTS
[facility] Pharmacy Note:  Renal Dose Adjustment    Mónica Suarez has been prescribed famotidine (PEPCID) 20 mg orally every 12 hours. Estimated Creatinine Clearance: 44.2 mL/min (based on SCr of 1.09 mg/dL).     Calculated creatinine clearance is < 50 ml/

## 2021-02-28 NOTE — ED PROVIDER NOTES
Patient Seen in: [facility] Emergency Department      History   Patient presents with:  Back Pain  Nausea/Vomiting/Diarrhea    Stated Complaint: back pain and abd pain    HPI/Subjective:   HPI    70-year-old white male presents emerged from today fr ESOPHAGOGASTRODUODENOSCOPY (EGD) N/A 12/30/2013    Performed by Gay Rivera MD at [facility] ENDOSCOPY   • [address]     • GASTROSCOPY N/A 12/23/2013    Performed by Gay Rivera MD at [facility] ENDOSCOPY   • GASTROSCOPY N/A 12/21/201 (36.5 °C)   Temp src 02/28/21 0857 Temporal   SpO2 02/28/21 0857 95 %   O2 Device 02/28/21 0958 None (Room air)       Current:/73   Pulse 120   Temp 97.7 °F (36.5 °C) (Temporal)   Resp 14   Wt 68 kg   SpO2 95%   BMI 21.51 kg/m²         Physical Exam components within normal limits   TROPONIN I - Normal   CBC WITH DIFFERENTIAL WITH PLATELET    Narrative: The following orders were created for panel order CBC WITH DIFFERENTIAL WITH PLATELET.   Procedure                               Abnormality BILIARY: Larwance Golds is intra and extrahepatic biliary dilatation.  Common bile duct measures 12 mm in diameter.  Cholecystectomy. .   SPLEEN:  Normal.  No enlargement or focal lesion.    PANCREAS:  No sydnie-pancreatic inflammatory stranding   ADRENALS:  Nodular effusion.    5. Colonic diverticulosis without evidence of diverticulitis.     6. Ectasia of the ascending thoracic aorta as well as a infrarenal abdominal aortic aneurysm as described above.                     MDM      Patient had an IV established in the

## 2021-02-28 NOTE — ED NOTES
Received call from MRI tech. Unknown if neurostimulator is compatible with MRI. Patient states \"the battery  years ago\", but per MRI tech, a remote is needed prior to MRI to adjusting setting. Needs to consult with MRI lead. MD notified.  Will be admi

## 2021-02-28 NOTE — CONSULTS
[facility] Pharmacy Note:  Renal Adjustment for levofloxacin (LEVAQUIN)    Xiomara Reyes is a 80year old patient who has been prescribed levofloxacin (LEVAQUIN) 500 mg x1 dose. The estimated creatinine clearance is 44.2 mL/min (based on SCr of 1.09 mg/dL).

## 2021-03-01 ENCOUNTER — APPOINTMENT (OUTPATIENT)
Dept: ULTRASOUND IMAGING | Facility: HOSPITAL | Age: 86
DRG: 699 | End: 2021-03-01
Attending: NURSE PRACTITIONER
Payer: MEDICARE

## 2021-03-01 LAB
ALBUMIN SERPL-MCNC: 2.9 G/DL (ref 3.4–5)
ALP LIVER SERPL-CCNC: 100 U/L
ALT SERPL-CCNC: 20 U/L
ANION GAP SERPL CALC-SCNC: 9 MMOL/L (ref 0–18)
AST SERPL-CCNC: 16 U/L (ref 15–37)
BASOPHILS # BLD AUTO: 0.03 X10(3) UL (ref 0–0.2)
BASOPHILS NFR BLD AUTO: 0.3 %
BILIRUB DIRECT SERPL-MCNC: 0.1 MG/DL (ref 0–0.2)
BILIRUB SERPL-MCNC: 0.5 MG/DL (ref 0.1–2)
BUN BLD-MCNC: 21 MG/DL (ref 7–18)
BUN/CREAT SERPL: 20.2 (ref 10–20)
CALCIUM BLD-MCNC: 9.1 MG/DL (ref 8.5–10.1)
CHLORIDE SERPL-SCNC: 92 MMOL/L (ref 98–112)
CO2 SERPL-SCNC: 22 MMOL/L (ref 21–32)
CREAT BLD-MCNC: 1.04 MG/DL
DEPRECATED RDW RBC AUTO: 45.4 FL (ref 35.1–46.3)
EOSINOPHIL # BLD AUTO: 0.05 X10(3) UL (ref 0–0.7)
EOSINOPHIL NFR BLD AUTO: 0.5 %
ERYTHROCYTE [DISTWIDTH] IN BLOOD BY AUTOMATED COUNT: 13.2 % (ref 11–15)
GLUCOSE BLD-MCNC: 81 MG/DL (ref 70–99)
HCT VFR BLD AUTO: 38.1 %
HGB BLD-MCNC: 12.5 G/DL
IMM GRANULOCYTES # BLD AUTO: 0.14 X10(3) UL (ref 0–1)
IMM GRANULOCYTES NFR BLD: 1.5 %
LIPASE SERPL-CCNC: 86 U/L (ref 73–393)
LYMPHOCYTES # BLD AUTO: 1.58 X10(3) UL (ref 1–4)
LYMPHOCYTES NFR BLD AUTO: 17 %
M PROTEIN MFR SERPL ELPH: 6.6 G/DL (ref 6.4–8.2)
MCH RBC QN AUTO: 30.8 PG (ref 26–34)
MCHC RBC AUTO-ENTMCNC: 32.8 G/DL (ref 31–37)
MCV RBC AUTO: 93.8 FL
MONOCYTES # BLD AUTO: 0.69 X10(3) UL (ref 0.1–1)
MONOCYTES NFR BLD AUTO: 7.4 %
NEUTROPHILS # BLD AUTO: 6.8 X10 (3) UL (ref 1.5–7.7)
NEUTROPHILS # BLD AUTO: 6.8 X10(3) UL (ref 1.5–7.7)
NEUTROPHILS NFR BLD AUTO: 73.3 %
OSMOLALITY SERPL CALC.SUM OF ELEC: 258 MOSM/KG (ref 275–295)
OSMOLALITY SERPL: 261 MOSM/KG (ref 280–300)
OSMOLALITY UR: 348 MOSM/KG (ref 300–1300)
PLATELET # BLD AUTO: 263 10(3)UL (ref 150–450)
POTASSIUM SERPL-SCNC: 4 MMOL/L (ref 3.5–5.1)
RBC # BLD AUTO: 4.06 X10(6)UL
SODIUM SERPL-SCNC: 123 MMOL/L (ref 136–145)
SODIUM SERPL-SCNC: 128 MMOL/L (ref 136–145)
T4 FREE SERPL-MCNC: 1.4 NG/DL (ref 0.8–1.7)
TSI SER-ACNC: 0.94 MIU/ML (ref 0.36–3.74)
WBC # BLD AUTO: 9.3 X10(3) UL (ref 4–11)

## 2021-03-01 PROCEDURE — 99223 1ST HOSP IP/OBS HIGH 75: CPT | Performed by: INTERNAL MEDICINE

## 2021-03-01 PROCEDURE — 99232 SBSQ HOSP IP/OBS MODERATE 35: CPT | Performed by: INTERNAL MEDICINE

## 2021-03-01 PROCEDURE — 76705 ECHO EXAM OF ABDOMEN: CPT | Performed by: NURSE PRACTITIONER

## 2021-03-01 RX ORDER — MORPHINE SULFATE 2 MG/ML
1 INJECTION, SOLUTION INTRAMUSCULAR; INTRAVENOUS EVERY 6 HOURS PRN
Status: DISCONTINUED | OUTPATIENT
Start: 2021-03-01 | End: 2021-03-04

## 2021-03-01 RX ORDER — TOLVAPTAN 15 MG/1
15 TABLET ORAL ONCE
Status: COMPLETED | OUTPATIENT
Start: 2021-03-01 | End: 2021-03-01

## 2021-03-01 NOTE — PROGRESS NOTES
[facility]  Progress Note    Zaidavaishali El Patient Status:  Inpatient    12/15/1931 MRN [id number]   [facility] 3NW-A Attending Charlie Wheatley MD   [facility] Day # 1 PCP Herrera Brady DO     CC: Abdominal pain    SUBJECTIVE:  Persis 2.9 03/01/2021    ALKPHO 100 03/01/2021    BILT 0.5 03/01/2021    TP 6.6 03/01/2021    AST 16 03/01/2021    ALT 20 03/01/2021       Imaging:    CTA CHEST + CT ABD (W) + CT PEL (W) (CPT=71275/01644)     COMPARISON:  [facility] , CT, CT ABDOMEN PELVIS IV CONTR BOWEL/MESENTERY:  Bowel is normal in caliber. No evidence of obstruction. Colonic diverticulosis without evidence of diverticulitis. PELVIS:  Bladder is nondistended. There is Diaz catheter within the bladder. No free pelvic fluid.      BONES:  Right Intravenous, Q6H PRN    •  escitalopram (LEXAPRO) tablet 5 mg, 5 mg, Oral, Nightly    •  Oxybutynin Chloride ER (DITROPAN-XL) 24 hr tab 10 mg, 10 mg, Oral, Daily    •  HYDROcodone-acetaminophen (NORCO) 5-325 MG per tab 1 tablet, 1 tablet, Oral, Q4H PRN the patient be referred to TCC on discharge?: yes  Estimated date of discharge: TBD  Discharge is dependent on: Clinical progress  At this point Mr. Sally Kirk  is expected to be discharge to:  To be decided      Questions/concerns and Plan of care were disc

## 2021-03-01 NOTE — CONSULTS
[facility]                       Gastroenterology [facility] Gastroenterology    Viktoria Earlkatiuska Patient Status:  Inpatient    12/15/1931 MRN [id number]   [facility] 3NW-A Attending Rodrigo Bustillo MD   [facility] Day # 1 PCP Gina Gil Personal history of contact with and (suspected) exposure to asbestos    • Unspecified essential hypertension    • Visual impairment     glasses     PSHx:   Past Surgical History:   Procedure Laterality Date   • BACK SURGERY  6/3/11    L4-5 decompression levoFLOXacin in D5W (LEVAQUIN) IVPB premix 750 mg, 750 mg, Intravenous, Once    •  [COMPLETED] acetaminophen (TYLENOL) tab 650 mg, 650 mg, Oral, Once    •  Enoxaparin Sodium (LOVENOX) 40 MG/0.4ML injection 40 mg, 40 mg, Subcutaneous, Nightly    •  0.9% NaC or chronic skin disorders            Rheumatologic: + chronic arthritis, myalgias, arthralgias            Genitourinary:  The patient reports no history of recurrent urinary tract infections, hematuria, dysuria, or nephrolithiasis           Psychiatric: + 123* 123* 123*   K 4.1 4.3 4.0   CL 91* 89* 92*   CO2 22.0 24.0 22.0     Recent Labs   Lab 03/01/21  0539   RBC 4.06   HGB 12.5*   HCT 38.1*   MCV 93.8   MCH 30.8   MCHC 32.8   RDW 13.2   NEPRELIM 6.80   WBC 9.3   .0       Recent Labs   Lab 02/26/21 adrenal glands bilaterally which is stable. KIDNEYS:  Kidneys are symmetrical in size without evidence of hydronephrosis. Bilateral renal cysts, largest measuring 4.4 x 2.8 cm. BOWEL/MESENTERY:  Bowel is normal in caliber. No evidence of obstruction. 2/28/2021 at 12:18 PM      Impression: 80 yr-old male with hx of HTN, dyslipidemia, prostate cancer s/p chemoRT with chronic Diaz, chronic pain s/p nerve stimulator, and s/p cholecystectomy (distant past) admitted yesterday with acute abd pain.  CTA C with LUQ pain is related to dilated CBD. CBD dilation can be related to age, s/p CCY. Pain not reproducible on exam, and pt now complaining of dysuria this evening. Agree with treatment of UTI  RUQ US shows no signs of gallstones.    No role for inpatient GI

## 2021-03-01 NOTE — OCCUPATIONAL THERAPY NOTE
OCCUPATIONAL THERAPY EVALUATION - INPATIENT     Room Number: 325/325-A  Evaluation Date: 3/1/2021  Type of Evaluation: Initial  Presenting Problem: Abdominal Pain    Physician Order: IP Consult to Occupational Therapy  Reason for Therapy: ADL/IADL Dysfunct Date   • BACK SURGERY  6/3/11    L4-5 decompression   • CHOLECYSTECTOMY     • ESOPHAGOGASTRODUODENOSCOPY (EGD) N/A 9/17/2014    Performed by Jason Baron MD at [facility] ENDOSCOPY   • ESOPHAGOGASTRODUODENOSCOPY (EGD) N/A 12/30/2013    Performed by [facility]. SUBJECTIVE   Pt stated, \"My side really hurts. \"    Patient self-stated goal is to go home     OBJECTIVE     Fall Risk: Standard fall risk    WEIGHT BEARING RESTRICTION  Weight Bearing Restriction: None                PAIN AS assistance    Skilled Therapy Provided: Pt was received supine in bed for session, pt agreeable to therapy, pt t/f to eOB with CGA, pt donned pants with min assist, therapist completed MMT and ROM on pt, pt was able to amb x1 in room with RW and CGA, thera eval/education;Patient/Family training;Patient/Family education; Endurance training;UE strengthening/ROM; Functional transfer training  Rehab Potential : Good  Frequency (Obs): 3-5x/week  Number of Visits to Meet Established Goals: 5    ADL Goals   Patient w

## 2021-03-01 NOTE — DIETARY NOTE
[address]     Admitting diagnosis:  Hyponatremia [E87.1]  Acute cystitis without hematuria [N30.00]    Ht:  5'10\"  Wt: 68 kg (149 lb 14.6 oz). Body mass index is 21.51 kg/m².     Wt Readings from Last 6 En

## 2021-03-01 NOTE — PHYSICAL THERAPY NOTE
PHYSICAL THERAPY EVALUATION - INPATIENT     Room Number: 325/325-A  Evaluation Date: 3/1/2021  Type of Evaluation: Initial  Physician Order: PT Eval and Treat    Presenting Problem: hyponatremia  Reason for Therapy: Mobility Dysfunction and Discharge SURGERY  6/3/11    L4-5 decompression   • CHOLECYSTECTOMY     • ESOPHAGOGASTRODUODENOSCOPY (EGD) N/A 9/17/2014    Performed by Edin Sweet MD at [facility] ENDOSCOPY   • ESOPHAGOGASTRODUODENOSCOPY (EGD) N/A 12/30/2013    Performed by Gary Ritter MD Fall Risk: Standard fall risk    WEIGHT BEARING RESTRICTION  Weight Bearing Restriction: None                PAIN ASSESSMENT  Rating: Unable to rate  Location: pain in L side  Management Techniques: Activity promotion; Body mechanics;Breathing techniques; Modifier (G-Code): CK    FUNCTIONAL ABILITY STATUS  Gait Assessment   Gait Assistance: Contact guard assist  Distance (ft): 10  Assistive Device: Rolling walker  Pattern: Shuffle(increased flexed posture)  Stoop/Curb Assistance: Not tested       Skilled Th themselves as functional limitations in independent bed mobility, transfers, and gait. Pt required Min A for supine<>sit transfer. Pt able to complete transfers and short distance ambulation at CGA level.  The patient is mildly below baseline and would bene

## 2021-03-01 NOTE — PROGRESS NOTES
Pain Service    Spoke with RN regard patient's implanted spinal cord stimulator    Patient has a [employer] Spinal Cord Stimulator implanted by Dr. Keyla Ramos 8/20/20     Spoke with Ernestine Guerrero Rep.   This patient's spinal cord stimulator

## 2021-03-01 NOTE — PLAN OF CARE
Patient A&Ox2, forgetful. IVF continued. VSS, telemetry and  monitored. C/O right/left abdominal pain, prn medication administered. Chronic duron in place, adequate output, concentrated in color. Concerns addressed, none further at this time.    Problem: appropriate  - Identify discharge learning needs (meds, wound care, etc)  - Arrange for interpreters to assist at discharge as needed  - Consider post-discharge preferences of patient/family/discharge partner  - Complete POLST form as appropriate  - Assess

## 2021-03-01 NOTE — CONSULTS
[facility]  Report of Consultation    Yessenia Smalls Patient Status:  Inpatient    12/15/1931 MRN [id number]   [facility] 3NW-A Attending Regina Parker MD   [address] Day # 1 PCP Johnna Quezada DO     Reason for Consultation:  hypona Performed by Kary Rodríguez DO at [facility] ENDOSCOPY   • GASTROSCOPY N/A 12/19/2013    Performed by Ashia Negro MD at [facility] 7/9/2018    Performed by Carlos Do MD at [facility] MAIN OR   • L mg, Oral, Nightly  •  Oxybutynin Chloride ER (DITROPAN-XL) 24 hr tab 10 mg, 10 mg, Oral, Daily  •  HYDROcodone-acetaminophen (NORCO) 5-325 MG per tab 1 tablet, 1 tablet, Oral, Q4H PRN  •  lisinopril tab 5 mg, 5 mg, Oral, Daily  •  Sertraline HCl (ZOLOFT) t rhonchi. Abdomen: Soft,+ -tender L abd. + bowel sounds  Extremities: Without clubbing, cyanosis or edema.   Neurologic:  moving all extremities  Skin: Warm and dry, no rashes      Laboratory Data:  Lab Results   Component Value Date    WBC 9.3 03/01/2021 zoloft and lexapro. Given acute, documented drop in Na will dose with tolvaptan x 1 this afternoon. Check thyroid studies as well    #2. UTI- on levaquin    #3. HTN- cont lisinopril    #4  abd pain- CT noted.   RAHAT valdovinos        Thank you for allowing m

## 2021-03-02 LAB
ANION GAP SERPL CALC-SCNC: 6 MMOL/L (ref 0–18)
BUN BLD-MCNC: 15 MG/DL (ref 7–18)
BUN/CREAT SERPL: 14.9 (ref 10–20)
CALCIUM BLD-MCNC: 9.3 MG/DL (ref 8.5–10.1)
CHLORIDE SERPL-SCNC: 101 MMOL/L (ref 98–112)
CO2 SERPL-SCNC: 26 MMOL/L (ref 21–32)
CREAT BLD-MCNC: 1.01 MG/DL
GLUCOSE BLD-MCNC: 80 MG/DL (ref 70–99)
OSMOLALITY SERPL CALC.SUM OF ELEC: 276 MOSM/KG (ref 275–295)
POTASSIUM SERPL-SCNC: 4.6 MMOL/L (ref 3.5–5.1)
SODIUM SERPL-SCNC: 133 MMOL/L (ref 136–145)

## 2021-03-02 PROCEDURE — 99232 SBSQ HOSP IP/OBS MODERATE 35: CPT | Performed by: INTERNAL MEDICINE

## 2021-03-02 RX ORDER — SULFAMETHOXAZOLE AND TRIMETHOPRIM 800; 160 MG/1; MG/1
1 TABLET ORAL ONCE
Status: COMPLETED | OUTPATIENT
Start: 2021-03-02 | End: 2021-03-02

## 2021-03-02 RX ORDER — SULFAMETHOXAZOLE AND TRIMETHOPRIM 800; 160 MG/1; MG/1
1 TABLET ORAL EVERY 12 HOURS SCHEDULED
Status: DISCONTINUED | OUTPATIENT
Start: 2021-03-02 | End: 2021-03-04

## 2021-03-02 NOTE — PROGRESS NOTES
[facility]  Progress Note    [facility] Patient Status:  Inpatient    12/15/1931 MRN [id number]   [facility] 3NW-A Attending Lowell Nevarez MD   [facility] Day # 2 PCP Veronica Bridges DO     CC: Abdominal pain    SUBJECTIVE:  Christelle RENEE , CT, CT ABDOMEN PELVIS IV CONTRAST, NO ORAL (ER), 11/10/2018, 7:59 AM.  [facility] , CT, CTA CHEST + CT ABD (W) + CT PEL (W) (CPT=71275/93277), 10/01/2018, 2:11 PM.     INDICATIONS:  back pain and abd pain     TECHNIQUE:  CT of the chest (with CTA im fluid.     BONES:  Right hip arthroplasty. Spinal stimulator device noted. Stable compression deformity of the T11 through L4 vertebral bodies. Compression deformity L2 vertebral body has undergone vertebral body augmentation.   Interval compression defo 4 mg, Intravenous, Q6H PRN    •  Oxybutynin Chloride ER (DITROPAN-XL) 24 hr tab 10 mg, 10 mg, Oral, Daily    •  HYDROcodone-acetaminophen (NORCO) 5-325 MG per tab 1 tablet, 1 tablet, Oral, Q4H PRN    •  lisinopril tab 5 mg, 5 mg, Oral, Daily    •  traZODon discharge?: yes  Estimated date of discharge: Possibly tomorrow  Discharge is dependent on: Clinical progress, pain control, renal ultrasound  At this point Mr. Mayte Urban  is expected to be discharge to:  To be decided      Questions/concerns and Plan of ca

## 2021-03-02 NOTE — PROGRESS NOTES
[facility]  Progress Note    Ben Locke Patient Status:  Inpatient    12/15/1931 MRN [id number]   [facility] 3NW-A Attending Katelin Anderson MD   [facility] Day # 2 PCP ,      No acute complains  Reports abd pain impr MMM  Neck: Supple, no JULES or thyromegaly  Cardiac: Regular rate and rhythm, S1, S2 normal, no murmur or rub  Lungs: Clear without wheezes, rales, rhonchi. Abdomen: Soft,ND; non-tender  Extremities: Without clubbing, cyanosis or edema.   Neurologic:  movi

## 2021-03-02 NOTE — CM/SW NOTE
03/02/21 1500   CM/SW Referral Data   Referral Source Social Work (self-referral)   Reason for Referral Discharge planning   Informant Patient   Patient Info   Patient's Mental Status Alert;Oriented   Patient's Home Environment Senior Independent [facility]

## 2021-03-02 NOTE — PLAN OF CARE
Patient alert and oriented to self, forgetful. C/O LLQ pain prn medication given with relief. US abdomen showed dilation of CBD. Na resulted at 128. Chronic duron in place, urine clear good output. Telemetry and  maintained, NSR.  Bed alarm in place and Arrange for needed discharge resources and transportation as appropriate  - Identify discharge learning needs (meds, wound care, etc)  - Arrange for interpreters to assist at discharge as needed  - Consider post-discharge preferences of patient/family/disc

## 2021-03-02 NOTE — OCCUPATIONAL THERAPY NOTE
OCCUPATIONAL THERAPY TREATMENT NOTE - INPATIENT     Room Number: 325/325-A  Session: 1   Number of Visits to Meet Established Goals: 5    Presenting Problem: Abdominal Pain    History related to current admission: Per Yelena ramirez 80 year ol CHOLECYSTECTOMY     • ESOPHAGOGASTRODUODENOSCOPY (EGD) N/A 9/17/2014    Performed by Fermin Hardin MD at [facility] ENDOSCOPY   • ESOPHAGOGASTRODUODENOSCOPY (EGD) N/A 12/30/2013    Performed by Rosi Rodriguez MD at [facility] ENDOSCOPY   • [place] ASSESSMENT  AM-PAC ‘6-Clicks’ Inpatient Daily Activity Short Form  How much help from another person does the patient currently need…  -   Putting on and taking off regular lower body clothing?: A Little  -   Bathing (including washing, rinsing, drying)?: training;Patient/Family education; Endurance training;UE strengthening/ROM; Functional transfer training  Rehab Potential : Good  Frequency (Obs): 3-5x/week      OT Goals: ALL MET AS OF 3/2/2021    PPE worn: surgical mask, gloves, eye protection.   Pt wore ma

## 2021-03-02 NOTE — PHYSICAL THERAPY NOTE
PHYSICAL THERAPY TREATMENT NOTE - INPATIENT    Room Number: 325/325-A     Session: 1   Number of Visits to Meet Established Goals: 4    Presenting Problem: hyponatremia    History related to current admission: Per EMR Hoda Kerr a 80year old m 9/17/2014    Performed by Liliana Chicas MD at [facility] ENDOSCOPY   • ESOPHAGOGASTRODUODENOSCOPY (EGD) N/A 12/30/2013    Performed by Va Kerr MD at [facility] ENDOSCOPY   • [address]     • GASTROSCOPY N/A 12/23/2013    Performed by Dami Reyes Static Sitting: Fair +  Dynamic Sitting: Fair +           Static Standing: Fair -  Dynamic Standing: Fair -    ACTIVITY TOLERANCE                         O2 WALK                    AM-PAC EXERCISES  Lower Extremity Ankle pumps     Upper Extremity      Position Sitting     Repetitions      Sets        Patient End of Session: Up in chair;Call light within reach; Needs met;RN aware of session/findings; All patient questions and concerns Anupam Espinosa

## 2021-03-02 NOTE — CDS QUERY
Clarification – Potential Diagnosis Association  CLINICAL DOCUMENTATION CLARIFICATION FORM    Clinical information in the patient's medical record (provided below) includes documentation of diagnoses with potential association.  For accurate ICD-10-CM code

## 2021-03-03 ENCOUNTER — PATIENT OUTREACH (OUTPATIENT)
Dept: CASE MANAGEMENT | Age: 86
End: 2021-03-03

## 2021-03-03 ENCOUNTER — APPOINTMENT (OUTPATIENT)
Dept: ULTRASOUND IMAGING | Facility: HOSPITAL | Age: 86
DRG: 699 | End: 2021-03-03
Attending: INTERNAL MEDICINE
Payer: MEDICARE

## 2021-03-03 DIAGNOSIS — Z23 NEED FOR VACCINATION: ICD-10-CM

## 2021-03-03 LAB
ANION GAP SERPL CALC-SCNC: 5 MMOL/L (ref 0–18)
BUN BLD-MCNC: 12 MG/DL (ref 7–18)
BUN/CREAT SERPL: 11.4 (ref 10–20)
CALCIUM BLD-MCNC: 8.8 MG/DL (ref 8.5–10.1)
CHLORIDE SERPL-SCNC: 102 MMOL/L (ref 98–112)
CO2 SERPL-SCNC: 27 MMOL/L (ref 21–32)
CREAT BLD-MCNC: 1.05 MG/DL
GLUCOSE BLD-MCNC: 83 MG/DL (ref 70–99)
HAV IGM SER QL: 2.3 MG/DL (ref 1.6–2.6)
OSMOLALITY SERPL CALC.SUM OF ELEC: 277 MOSM/KG (ref 275–295)
POTASSIUM SERPL-SCNC: 4.6 MMOL/L (ref 3.5–5.1)
SODIUM SERPL-SCNC: 134 MMOL/L (ref 136–145)

## 2021-03-03 PROCEDURE — 99232 SBSQ HOSP IP/OBS MODERATE 35: CPT | Performed by: INTERNAL MEDICINE

## 2021-03-03 PROCEDURE — 99232 SBSQ HOSP IP/OBS MODERATE 35: CPT | Performed by: HOSPITALIST

## 2021-03-03 PROCEDURE — 76770 US EXAM ABDO BACK WALL COMP: CPT | Performed by: INTERNAL MEDICINE

## 2021-03-03 RX ORDER — POTASSIUM CHLORIDE 600 MG/1
8 TABLET, FILM COATED, EXTENDED RELEASE ORAL DAILY
Status: ON HOLD | COMMUNITY
End: 2021-03-10

## 2021-03-03 RX ORDER — SULFAMETHOXAZOLE AND TRIMETHOPRIM 800; 160 MG/1; MG/1
1 TABLET ORAL EVERY 12 HOURS SCHEDULED
Qty: 12 TABLET | Refills: 0 | Status: SHIPPED | OUTPATIENT
Start: 2021-03-03 | End: 2021-03-04

## 2021-03-03 RX ORDER — METHYLPREDNISOLONE 4 MG/1
TABLET ORAL
Qty: 21 TABLET | Refills: 0 | Status: SHIPPED | OUTPATIENT
Start: 2021-03-03 | End: 2021-03-04

## 2021-03-03 RX ORDER — BUMETANIDE 1 MG/1
1 TABLET ORAL EVERY MORNING
Status: ON HOLD | COMMUNITY
End: 2021-03-11

## 2021-03-03 NOTE — PROGRESS NOTES
1st attempt TCC apt request    [facility]  [address] [address]  [address] [facility]  [phone]    Unable to contact. Will try again.

## 2021-03-03 NOTE — PLAN OF CARE
Pt a&ox1, very forgetful. Tele monitoring NSR. VSS, afebrile. Pt passing gas. Chronic duron in place, draining clear yellow urine. Reporting moderate pain to left upper abdomen this evening - prn norco administerd per mar.  Ultrasound of kidneys/bladder pen partner in discharge planning  - Arrange for needed discharge resources and transportation as appropriate  - Identify discharge learning needs (meds, wound care, etc)  - Arrange for interpreters to assist at discharge as needed  - Consider post-discharge p

## 2021-03-03 NOTE — HOME CARE LIAISON
Met with patient at the bedside. Patient is agreeable to [facility]. Residential brochure and medicare scores provided with contact information. All questions addressed and answered. FLETCHER Schmitz updated.

## 2021-03-03 NOTE — PROGRESS NOTES
[facility]  Nephrology Progress Note    Keira Landa Patient Status:  Inpatient    12/15/1931 MRN [id number]   [facility] 3NW-A Attending Cherelle Escoto MD   [facility] Day # 3 PCP Germain Prasad DO       SUBJECTIVE:  Stable this AM 03/01/21  0539   ALT 14* 20 20   AST 15 24 16   ALB 3.2* 3.0* 2.9*       No results for input(s): PGLU in the last 168 hours.     Meds:     •  Sulfamethoxazole-TMP DS (BACTRIM DS) 800-160 MG per tab 1 tablet, 1 tablet, Oral, Q12H [facility]    •  Pantoprazole [place]

## 2021-03-03 NOTE — CM/SW NOTE
Per unit RN, pt's ride fell through. Pt's son is not available and his other kids  live [place] of [place]. Pt requesting medicar.  FLETCHER s/w Bora Martin at [facility] to confirm that staff will be available to help pt back in to his apartment today (pt does not have hi

## 2021-03-03 NOTE — PROGRESS NOTES
[facility]  Progress Note    Julia Gillespie Patient Status:  Inpatient    12/15/1931 MRN [id number]   [facility] 3NW-A Attending Stanley Fleischer, MD   [facility] Day # 3 PCP Vikram Johnson DO     CC: Abdominal pain    SUBJECTIVE:  Viktor (CPT=71275/50638), 10/01/2018, 2:11 PM.     INDICATIONS:  back pain and abd pain     TECHNIQUE:  CT of the chest (with CTA imaging), abdomen, and pelvis were obtained post- injection of non-ionic intravenous contrast material. Multi-planar reformatted/3- vertebral bodies. Compression deformity L2 vertebral body has undergone vertebral body augmentation. Interval compression deformity of   the L5 vertebral body. Multilevel degenerative disc disease.   Compression deformity of the T5, T6, and T8 vertebral HYDROcodone-acetaminophen (NORCO) 5-325 MG per tab 1 tablet, 1 tablet, Oral, Q4H PRN    •  lisinopril tab 5 mg, 5 mg, Oral, Daily    •  traZODone HCl (DESYREL) tab 50 mg, 50 mg, Oral, Nightly    •  famoTIDine (PEPCID) tab 20 mg, 20 mg, Oral, Nightly    • is expected to be discharge to: Home      Questions/concerns and Plan of care were discussed with patient and RN    MD [facility]

## 2021-03-04 VITALS
WEIGHT: 149.94 LBS | BODY MASS INDEX: 22 KG/M2 | SYSTOLIC BLOOD PRESSURE: 101 MMHG | HEART RATE: 71 BPM | OXYGEN SATURATION: 95 % | DIASTOLIC BLOOD PRESSURE: 62 MMHG | RESPIRATION RATE: 18 BRPM | TEMPERATURE: 98 F

## 2021-03-04 PROCEDURE — 99239 HOSP IP/OBS DSCHRG MGMT >30: CPT | Performed by: HOSPITALIST

## 2021-03-04 RX ORDER — SULFAMETHOXAZOLE AND TRIMETHOPRIM 800; 160 MG/1; MG/1
1 TABLET ORAL EVERY 12 HOURS SCHEDULED
Qty: 12 TABLET | Refills: 0 | Status: ON HOLD | OUTPATIENT
Start: 2021-03-04 | End: 2021-03-11

## 2021-03-04 RX ORDER — PANTOPRAZOLE SODIUM 40 MG/1
40 TABLET, DELAYED RELEASE ORAL
Status: DISCONTINUED | OUTPATIENT
Start: 2021-03-04 | End: 2021-03-04

## 2021-03-04 RX ORDER — HYDROCODONE BITARTRATE AND ACETAMINOPHEN 5; 325 MG/1; MG/1
1 TABLET ORAL EVERY 4 HOURS PRN
Qty: 20 TABLET | Refills: 0 | Status: ON HOLD | OUTPATIENT
Start: 2021-03-04 | End: 2021-03-11

## 2021-03-04 RX ORDER — METHYLPREDNISOLONE 4 MG/1
TABLET ORAL
Qty: 21 TABLET | Refills: 0 | Status: ON HOLD | OUTPATIENT
Start: 2021-03-04 | End: 2021-03-11

## 2021-03-04 RX ORDER — DEXAMETHASONE SODIUM PHOSPHATE 4 MG/ML
4 VIAL (ML) INJECTION ONCE
Status: COMPLETED | OUTPATIENT
Start: 2021-03-04 | End: 2021-03-04

## 2021-03-04 NOTE — CM/SW NOTE
Case discussed in rounds. Pt did not feel ready to dc yesterday, had no help at home. SW met with patient. He lives in an independent apt at [employer]. Has PT help from his caregiver Hannah Yeboah, 2-3x a week for a few hours.  Pt feels that he needs more help

## 2021-03-04 NOTE — PLAN OF CARE
A&Ox1-2. Very forgetful--short term jennifer loss. VSS. RA. . Anxious during days--No anxiety on NOC shift up to this point. Telemetry: NSR  GI: Abdomen soft, nondistended. Passing gas. Denies nausea.   : Chronic duron catheter draining clear yellow uri for assistance with activity based on assessment  - Modify environment to reduce risk of injury  - Provide assistive devices as appropriate  - Consider OT/PT consult to assist with strengthening/mobility  - Encourage toileting schedule  Outcome: Progressin

## 2021-03-04 NOTE — PHYSICAL THERAPY NOTE
PHYSICAL THERAPY TREATMENT NOTE - INPATIENT    Room Number: 325/325-A     Session: 2  Number of Visits to Meet Established Goals: 4    Presenting Problem: hyponatremia    History related to current admission: Per EMR Stafford Fred ramirez 80year old ma CHOLECYSTECTOMY     • ESOPHAGOGASTRODUODENOSCOPY (EGD) N/A 9/17/2014    Performed by Lizzy Davis MD at [facility] ENDOSCOPY   • ESOPHAGOGASTRODUODENOSCOPY (EGD) N/A 12/30/2013    Performed by Rodrigo Vásquez MD at [facility] ENDOSCOPY   • [place] Static Sitting: Fair  Dynamic Sitting: Fair           Static Standing: Fair -  Dynamic Standing: Fair -    ACTIVITY TOLERANCE                         O2 WALK has call button to call for help if needed. Educated pt on URIAH and rationale for d/c rec as pt is requiring increased assist .   Pt left in chair, needs met, alarm set. PCT made aware pt would like to walk more throughout the day.     D/w FLETCHER who states pt d ambulate 100 feet with assist device: walker - rolling at assistance level: modified independent      Goal #4     Goal #5     Goal #6     Goal Comments: Goals established on 3/1/2021   progressing   PPE worn: surgical mask, gloves, goggles.

## 2021-03-04 NOTE — PROGRESS NOTES
[facility]  Progress Note    Tani Arellano Patient Status:  Inpatient    12/15/1931 MRN [id number]   [facility] 3NW-A Attending Geri Vaz MD   [facility] Day # 4 PCP Roderick Edwards DO     CC: Abdominal pain    SUBJECTIVE:  Patien contrast material. Multi-planar reformatted/3-D images were created to optimize visualization of vascular anatomy. Dose reduction   techniques were used.  Dose information is transmitted to the ACR ([employer] of Radiology) Katiana Rodrigez [facility] Radiology D deformity of the T5, T6, and T8 vertebral bodies. The T6 vertebral body fracture is chronic. The other vertebral body fractures are new since 2018. VASCULATURE:  4 vessel arch with the left vertebral artery originating off the aortic arch.   Ectasia (REMERON) tab 30 mg, 30 mg, Oral, Nightly    •  ALPRAZolam (XANAX) tab 0.25 mg, 0.25 mg, Oral, BID PRN    •  lidocaine-menthol 4-1 % 1 patch, 1 patch, Transdermal, Daily        ASSESSMENT / PLAN:        1. Left upper quadrant abdominal pain with dilated in

## 2021-03-04 NOTE — PROGRESS NOTES
PT IS A&O X 1-2. HE GET'S CONFUSE AT MOMENTS. ON A REGULAR DIET. HE HAS NO IV, DUE TO PT WAS GOING TO BE DC, BUT HE HAD NO RIDE AND HAD NO ONE TO TAKE CARE OF HIM. PT IS UP W/SB ASSIST AND WALKER. WILL CONTINUE TO MONITOR.

## 2021-03-04 NOTE — PROGRESS NOTES
2nd attempt TCC apt request  [facility]  [address] [phone]     Unable to contact. Will try again.

## 2021-03-04 NOTE — PLAN OF CARE
Problem: PAIN - ADULT  Goal: Verbalizes/displays adequate comfort level or patient's stated pain goal  Description: INTERVENTIONS:  - Encourage pt to monitor pain and request assistance  - Assess pain using appropriate pain scale  - Administer analgesics Problem: DISCHARGE PLANNING  Goal: Discharge to home or other facility with appropriate resources  Description: INTERVENTIONS:  - Identify barriers to discharge w/pt and caregiver  - Include patient/family/discharge partner in discharge Thony Jenkins

## 2021-03-05 NOTE — DISCHARGE SUMMARY
[facility] HOSPITALIST  DISCHARGE SUMMARY     Carlos Dominique Patient Status:  Inpatient    12/15/1931 MRN [id number]   [facility] 3NW-A Attending No att. providers found   Hosp Day # 4 PCP Priscila Slater DO     Date of Admission:  descriptions):  • None    Lab/Test results pending at Discharge:   · None    Consultants:  • Renal, GI    Discharge Medication List:     Discharge Medications      START taking these medications      Instructions Prescription details   methylPREDNISolone 4 Nicole Luna  Notes to patient: *don't take with tylenol or tylenol containing products  *Don't exceed 4000 mg of acetaminophen in 24 hrs  *Don't drive or drink alcohol if taking Norco  *Take with food    *last dose was given TODAY AT 5:00 PM      Take 1 tablet b murmurs, rubs or gallops. Abdomen: Soft, nontender, nondistended. Positive bowel sounds. No rebound or guarding. Neurologic: No focal neurological deficits. Musculoskeletal: Moves all extremities. Extremities: No edema.   ----------------------------

## 2021-03-09 ENCOUNTER — APPOINTMENT (OUTPATIENT)
Dept: CT IMAGING | Facility: HOSPITAL | Age: 86
DRG: 315 | End: 2021-03-09
Attending: EMERGENCY MEDICINE
Payer: MEDICARE

## 2021-03-09 ENCOUNTER — HOSPITAL ENCOUNTER (INPATIENT)
Facility: HOSPITAL | Age: 86
LOS: 1 days | Discharge: NURSING, REHAB OR LONG-TERM CARE | DRG: 315 | End: 2021-03-11
Attending: HOSPITALIST | Admitting: HOSPITALIST
Payer: MEDICARE

## 2021-03-09 ENCOUNTER — APPOINTMENT (OUTPATIENT)
Dept: GENERAL RADIOLOGY | Facility: HOSPITAL | Age: 86
DRG: 315 | End: 2021-03-09
Attending: EMERGENCY MEDICINE
Payer: MEDICARE

## 2021-03-09 DIAGNOSIS — N28.9 RENAL INSUFFICIENCY: ICD-10-CM

## 2021-03-09 DIAGNOSIS — I95.9 HYPOTENSION, UNSPECIFIED HYPOTENSION TYPE: Primary | ICD-10-CM

## 2021-03-09 DIAGNOSIS — K59.00 CONSTIPATION, UNSPECIFIED CONSTIPATION TYPE: ICD-10-CM

## 2021-03-09 PROBLEM — R73.9 HYPERGLYCEMIA: Status: ACTIVE | Noted: 2021-03-09

## 2021-03-09 PROCEDURE — 71045 X-RAY EXAM CHEST 1 VIEW: CPT | Performed by: EMERGENCY MEDICINE

## 2021-03-09 PROCEDURE — 99220 INITIAL OBSERVATION CARE,LEVL III: CPT | Performed by: HOSPITALIST

## 2021-03-09 PROCEDURE — 74176 CT ABD & PELVIS W/O CONTRAST: CPT | Performed by: EMERGENCY MEDICINE

## 2021-03-10 ENCOUNTER — APPOINTMENT (OUTPATIENT)
Dept: GENERAL RADIOLOGY | Facility: HOSPITAL | Age: 86
DRG: 315 | End: 2021-03-10
Attending: HOSPITALIST
Payer: MEDICARE

## 2021-03-10 ENCOUNTER — APPOINTMENT (OUTPATIENT)
Dept: CV DIAGNOSTICS | Facility: HOSPITAL | Age: 86
DRG: 315 | End: 2021-03-10
Attending: HOSPITALIST
Payer: MEDICARE

## 2021-03-10 PROBLEM — K59.00 CONSTIPATION, UNSPECIFIED CONSTIPATION TYPE: Status: ACTIVE | Noted: 2021-03-10

## 2021-03-10 PROBLEM — N28.9 RENAL INSUFFICIENCY: Status: ACTIVE | Noted: 2021-03-10

## 2021-03-10 PROBLEM — I95.9 HYPOTENSION, UNSPECIFIED HYPOTENSION TYPE: Status: ACTIVE | Noted: 2021-03-10

## 2021-03-10 PROCEDURE — 71101 X-RAY EXAM UNILAT RIBS/CHEST: CPT | Performed by: HOSPITALIST

## 2021-03-10 PROCEDURE — 99225 SUBSEQUENT OBSERVATION CARE: CPT | Performed by: HOSPITALIST

## 2021-03-10 PROCEDURE — 93306 TTE W/DOPPLER COMPLETE: CPT | Performed by: HOSPITALIST

## 2021-03-10 RX ORDER — ALENDRONATE SODIUM 70 MG/1
70 TABLET ORAL WEEKLY
COMMUNITY

## 2021-03-10 RX ORDER — BICALUTAMIDE 50 MG/1
50 TABLET, FILM COATED ORAL EVERY EVENING
COMMUNITY

## 2021-03-10 RX ORDER — ACETAMINOPHEN 325 MG/1
650 TABLET ORAL EVERY 6 HOURS PRN
Status: DISCONTINUED | OUTPATIENT
Start: 2021-03-10 | End: 2021-03-11

## 2021-03-10 RX ORDER — POTASSIUM CHLORIDE 600 MG/1
8 TABLET, FILM COATED, EXTENDED RELEASE ORAL EVERY MORNING
Status: ON HOLD | COMMUNITY
End: 2021-03-11

## 2021-03-10 RX ORDER — ALPRAZOLAM 0.25 MG/1
0.25 TABLET ORAL EVERY 12 HOURS PRN
Status: ON HOLD | COMMUNITY
Start: 2021-03-06 | End: 2021-03-11

## 2021-03-10 RX ORDER — SENNOSIDES 8.6 MG
8.6 TABLET ORAL 2 TIMES DAILY
Status: DISCONTINUED | OUTPATIENT
Start: 2021-03-10 | End: 2021-03-11

## 2021-03-10 RX ORDER — SODIUM CHLORIDE 9 MG/ML
INJECTION, SOLUTION INTRAVENOUS CONTINUOUS
Status: DISCONTINUED | OUTPATIENT
Start: 2021-03-10 | End: 2021-03-11

## 2021-03-10 RX ORDER — ONDANSETRON 2 MG/ML
8 INJECTION INTRAMUSCULAR; INTRAVENOUS EVERY 6 HOURS PRN
Status: DISCONTINUED | OUTPATIENT
Start: 2021-03-10 | End: 2021-03-11

## 2021-03-10 RX ORDER — TROSPIUM CHLORIDE ER 60 MG/1
60 CAPSULE ORAL EVERY MORNING
COMMUNITY

## 2021-03-10 RX ORDER — HYDROCODONE BITARTRATE AND ACETAMINOPHEN 5; 325 MG/1; MG/1
2 TABLET ORAL EVERY 4 HOURS PRN
Status: DISCONTINUED | OUTPATIENT
Start: 2021-03-10 | End: 2021-03-11

## 2021-03-10 RX ORDER — TRAZODONE HYDROCHLORIDE 50 MG/1
50 TABLET ORAL NIGHTLY
COMMUNITY

## 2021-03-10 RX ORDER — POLYETHYLENE GLYCOL 3350 17 G/17G
17 POWDER, FOR SOLUTION ORAL DAILY
Status: DISCONTINUED | OUTPATIENT
Start: 2021-03-10 | End: 2021-03-11

## 2021-03-10 RX ORDER — BISACODYL 10 MG
10 SUPPOSITORY, RECTAL RECTAL
COMMUNITY

## 2021-03-10 RX ORDER — HEPARIN SODIUM 5000 [USP'U]/ML
5000 INJECTION, SOLUTION INTRAVENOUS; SUBCUTANEOUS EVERY 12 HOURS SCHEDULED
Status: DISCONTINUED | OUTPATIENT
Start: 2021-03-10 | End: 2021-03-11

## 2021-03-10 RX ORDER — KETOROLAC TROMETHAMINE 15 MG/ML
15 INJECTION, SOLUTION INTRAMUSCULAR; INTRAVENOUS EVERY 6 HOURS PRN
Status: DISCONTINUED | OUTPATIENT
Start: 2021-03-10 | End: 2021-03-10

## 2021-03-10 RX ORDER — BISACODYL 10 MG
10 SUPPOSITORY, RECTAL RECTAL
Status: DISCONTINUED | OUTPATIENT
Start: 2021-03-10 | End: 2021-03-11

## 2021-03-10 RX ORDER — POLYETHYLENE GLYCOL 3350 17 G/17G
17 POWDER, FOR SOLUTION ORAL DAILY
COMMUNITY

## 2021-03-10 RX ORDER — SENNOSIDES 8.6 MG
8.6 TABLET ORAL 2 TIMES DAILY
COMMUNITY

## 2021-03-10 RX ORDER — FAMOTIDINE 20 MG/1
20 TABLET ORAL 2 TIMES DAILY
COMMUNITY

## 2021-03-10 RX ORDER — BICALUTAMIDE 50 MG/1
50 TABLET, FILM COATED ORAL DAILY
Status: DISCONTINUED | OUTPATIENT
Start: 2021-03-10 | End: 2021-03-11

## 2021-03-10 RX ORDER — HYDROCODONE BITARTRATE AND ACETAMINOPHEN 5; 325 MG/1; MG/1
1 TABLET ORAL EVERY 4 HOURS PRN
Status: DISCONTINUED | OUTPATIENT
Start: 2021-03-10 | End: 2021-03-10

## 2021-03-10 RX ORDER — ONDANSETRON 2 MG/ML
8 INJECTION INTRAMUSCULAR; INTRAVENOUS EVERY 6 HOURS PRN
Status: DISCONTINUED | OUTPATIENT
Start: 2021-03-10 | End: 2021-03-10

## 2021-03-10 RX ORDER — POLYETHYLENE GLYCOL 3350 17 G/17G
17 POWDER, FOR SOLUTION ORAL DAILY PRN
Status: DISCONTINUED | OUTPATIENT
Start: 2021-03-10 | End: 2021-03-11

## 2021-03-10 RX ORDER — ALPRAZOLAM 0.25 MG/1
0.25 TABLET ORAL 3 TIMES DAILY PRN
Status: DISCONTINUED | OUTPATIENT
Start: 2021-03-10 | End: 2021-03-10

## 2021-03-10 RX ORDER — MIRTAZAPINE 15 MG/1
30 TABLET, FILM COATED ORAL NIGHTLY
Status: DISCONTINUED | OUTPATIENT
Start: 2021-03-10 | End: 2021-03-11

## 2021-03-10 RX ORDER — TRAZODONE HYDROCHLORIDE 50 MG/1
50 TABLET ORAL NIGHTLY
Status: DISCONTINUED | OUTPATIENT
Start: 2021-03-10 | End: 2021-03-11

## 2021-03-10 RX ORDER — ALPRAZOLAM 0.25 MG/1
0.25 TABLET ORAL 2 TIMES DAILY PRN
Status: DISCONTINUED | OUTPATIENT
Start: 2021-03-10 | End: 2021-03-11

## 2021-03-10 RX ORDER — ARGININE/ASCORBATE SOD/VITE AC 4.5 G/9.2G
1 POWDER IN PACKET (EA) ORAL 3 TIMES DAILY
COMMUNITY

## 2021-03-10 RX ORDER — FAMOTIDINE 20 MG/1
20 TABLET ORAL DAILY
Status: DISCONTINUED | OUTPATIENT
Start: 2021-03-10 | End: 2021-03-11

## 2021-03-10 RX ORDER — HYDROCODONE BITARTRATE AND ACETAMINOPHEN 5; 325 MG/1; MG/1
1 TABLET ORAL EVERY 4 HOURS PRN
Status: DISCONTINUED | OUTPATIENT
Start: 2021-03-10 | End: 2021-03-11

## 2021-03-10 RX ORDER — MELATONIN
3 NIGHTLY PRN
Status: DISCONTINUED | OUTPATIENT
Start: 2021-03-10 | End: 2021-03-11

## 2021-03-10 RX ORDER — ZINC SULFATE 50(220)MG
220 CAPSULE ORAL DAILY
COMMUNITY

## 2021-03-10 RX ORDER — OXYBUTYNIN CHLORIDE 5 MG/1
10 TABLET, EXTENDED RELEASE ORAL DAILY
Status: DISCONTINUED | OUTPATIENT
Start: 2021-03-11 | End: 2021-03-11

## 2021-03-10 RX ORDER — MULTIVITAMIN
1 TABLET ORAL DAILY
COMMUNITY

## 2021-03-10 NOTE — PLAN OF CARE
Alert to self and time, disoriented to place and situation. Forgetful and confused, requires frequent reminding and re-orientation. Per SNF this is patient's baseline. On Room air, saturations WDL. NSR/sinus nela on tele, rates in 50s while asleep.  SCDs o

## 2021-03-10 NOTE — DIETARY NOTE
[facility]    NUTRITION ASSESSMENT    Pt meets severe malnutrition criteria.     CRITERIA FOR MALNUTRITION DIAGNOSIS:  Criteria for severe malnutrition diagnosis: acute illness/injury related to wt loss greater than 5% in 1 month, energy intake less th No  Cultural/Ethnic/Gnosticism Preferences Addresses: Yes    NUTRITION RELATED PHYSICAL FINDINGS:     1. Body Fat/Muscle Mass: moderate depletion body fat and moderate depletion muscle mass per visual exam.     2. Fluid Accumulation: none.     NUTRITION PRES

## 2021-03-10 NOTE — BH PROGRESS NOTE
Liaison consult, not able to see pt today due to his confusion and lethargy. Анна Durbin will check again tomorrow.

## 2021-03-10 NOTE — ED PROVIDER NOTES
Patient Seen in: [facility] Emergency Department      History   Patient presents with:  Hypotension    Stated Complaint: Hypotension    HPI/Subjective:   HPI    35-year-old male presents emergency department who was brought from [facility] Brenda Zepeda MD at [facility] ENDOSCOPY   • [address]     • GASTROSCOPY N/A 12/23/2013    Performed by Brenda Zepeda MD at [facility] ENDOSCOPY   • GASTROSCOPY N/A 12/21/2013    Performed by Remigio Car DO at [address] Current:/80   Pulse 78   Temp 97.5 °F (36.4 °C) (Temporal)   Resp 18   Ht 172.7 cm (5' 8\")   Wt 61 kg   SpO2 97%   BMI 20.45 kg/m²         Physical Exam    All measures to prevent infection transmission during my interaction with the patient w - Normal   RAPID SARS-COV-2 BY PCR - Normal   CBC WITH DIFFERENTIAL WITH PLATELET    Narrative: The following orders were created for panel order CBC WITH DIFFERENTIAL WITH PLATELET.   Procedure                               Abnormality         Status ABDOMEN+PELVIS(CPT=74176)    Result Date: 3/9/2021  PROCEDURE:  CT ABDOMEN+PELVIS (CPT=74176)  COMPARISON:  [facility] , CT, CTA CHEST + CT ABD (W) + CT PEL (W) (CPT=71275/19721), 2/28/2021, 11:14 AM.  INDICATIONS:  Hypotension  TECHNIQUE:  Unenhanced multis which is stable. The most severe compression fractures at L1 with near vertebral  plana. LUNG BASES:  There is a small to moderate pericardial effusion which is slightly larger than the previous study measuring up to 10 mm in thickness.   There are at leas indwelling Diaz catheter.      Dictated by (CST): Jameson Mcgill MD on 3/03/2021 at 9:40 AM     Finalized by (CST): Jameson Mcgill MD on 3/03/2021 at 9:44 AM       XR CHEST AP PORTABLE  (CPT=71045)    Result Date: 3/9/2021  PROCEDURE:  XR CHEST AP PORTAB aorta. No pleural effusions. No pneumothorax. Osteoarthritic changes within the shoulders. CONCLUSION:  1. Stable right basilar atelectasis or scarring. 2. No significant change since prior examination.      Dictated by (CST): Rosalina Ansari optimize visualization of vascular anatomy. Dose reduction techniques were used. Dose information is transmitted to the ACR [employer] of Radiology) NRDR ([address]) which includes the Dose Index Registry.   PATIENT STATED HIS vertebral body fractures are new since 2018. VASCULATURE:  4 vessel arch with the left vertebral artery originating off the aortic arch. Ectasia of the ascending thoracic aorta measuring 4.2 x 4.4 cm.   Enlargement of pulmonary artery suggesting pulmonary As a result errors may occur. When identified these errors have been corrected.  While every attempt is made to correct errors during dictation discrepancies may still exist  Admission disposition: 3/9/2021 10:51 PM                              Disposition

## 2021-03-10 NOTE — PROGRESS NOTES
[facility] HOSPITALIST  Progress Note     Tani Arellano Patient Status:  Observation    12/15/1931 MRN [id number]   [facility] 8NE-A Attending Michael Muñoz, [address] [facility] Day # 0 PCP Roderick Edwards DO     Chief Complaint: Left rib pain    S Epic.    Medications:   • PEG 3350  17 g Oral Daily   • Heparin Sodium (Porcine)  5,000 Units Subcutaneous 2 times per day   • bicalutamide  50 mg Oral Daily   • famoTIDine  20 mg Oral Daily   • mirtazapine  30 mg Oral Nightly   • traZODone HCl  50 mg Oral

## 2021-03-10 NOTE — H&P
[facility]PITAL[facility]  History and Physical     Yessenia Smalls Patient Status:  Emergency    12/15/1931 MRN [id number]   Location [address] [facility] Attending Flor Raymond MD   Hosp Day # 0 PCP Johnna Quezada DO     Chief Complaint Performed by Warren Maria DO at [facility] ENDOSCOPY   • GASTROSCOPY N/A 12/19/2013    Performed by Richard Cornejo MD at [facility] ENDOSCOPY   • HERNIA SURGERY     • HIP TOTAL REPLACEMENT Right 7/9/2018    Performed by Margarito Garcia MD at [address] [place] [address] Rfl: 0  HYDROcodone-acetaminophen 5-325 MG Oral Tab, Take 1 tablet by mouth every 4 (four) hours as needed. , Disp: 20 tablet, Rfl: 0  bumetanide 1 MG Oral Tab, Take 1 mg by mouth daily. , Disp: , Rfl:   Potassium Chloride ER 8 MEQ Oral Tab CR, Take 8 mEq by S1, S2. Regular rate and rhythm. No murmurs, rubs or gallops. Equal pulses. Chest and Back: No tenderness or deformity. Abdomen: Soft, nontender, nondistended. Positive bowel sounds. No rebound, guarding or organomegaly.   Neurologic: No focal neurologi ayush  Quality:  · DVT Prophylaxis: heparin  · CODE status: full  · Diaz: no  · If COVID testing is negative, may discontinue isolation: yes     Plan of care discussed with patient and er md Lizzeth Avalos MD  3/9/2021

## 2021-03-10 NOTE — ED INITIAL ASSESSMENT (HPI)
Patient brought from [facility] for hypotension. BP 88/78. EMS stated patient had been c/o abd pain but had a bowel movement PTA and abd pain remarkably better.

## 2021-03-10 NOTE — PROGRESS NOTES
03/10/21 0113 03/10/21 0114 03/10/21 0116   Vital Signs   /70 126/75 119/74   MAP (mmHg) 82 87 80   BP Location Left arm Left arm Left arm   BP Method Automatic Automatic Automatic   Patient Position Lying Sitting Standing   orthostatic blood pres

## 2021-03-10 NOTE — PLAN OF CARE
Assumed care this am. Pt is alert and oriented x1- needs to be reoriented to place and time, forgetful. On isolation - pt from nursing home. Rapid COVID (-). Pt has chronic duron and was admitted with one in place.  Removed duron d/t malfunction and leaking

## 2021-03-11 VITALS
WEIGHT: 120.56 LBS | RESPIRATION RATE: 19 BRPM | OXYGEN SATURATION: 96 % | SYSTOLIC BLOOD PRESSURE: 100 MMHG | HEART RATE: 89 BPM | HEIGHT: 68 IN | BODY MASS INDEX: 18.27 KG/M2 | TEMPERATURE: 98 F | DIASTOLIC BLOOD PRESSURE: 57 MMHG

## 2021-03-11 PROCEDURE — 99217 OBSERVATION CARE DISCHARGE: CPT | Performed by: HOSPITALIST

## 2021-03-11 RX ORDER — HYDROCODONE BITARTRATE AND ACETAMINOPHEN 5; 325 MG/1; MG/1
1 TABLET ORAL EVERY 4 HOURS PRN
Qty: 15 TABLET | Refills: 0 | Status: SHIPPED | OUTPATIENT
Start: 2021-03-11 | End: 2021-03-17

## 2021-03-11 NOTE — PLAN OF CARE
Pt ok to discharge per hospitalist. Pt to [facility]. Report given to nurse at [facility]. All belongings sent with patient.

## 2021-03-11 NOTE — HOME CARE LIAISON
This patient is pending with [facility]. Patient discharged to [facility] during last hospital stay. [facility], [facility] will continue to follow.

## 2021-03-11 NOTE — BH LEVEL OF CARE ASSESSMENT
Level of Care Assessment Note     Patient is 79 y/o male, stated he live in [facility], stated he has his own apartment, he's been there for years, with his Dog name Spenser Bey. Patient was seen for  Crisis evaluation  because of depression.  Patient tenisha LBS  IBW %: 87.33 %  IBW + 10%: 169.4 LBS  IBW - 10%: 138.6 LBS [phone]  Referral 3:  [facility] [phone]

## 2021-03-11 NOTE — DISCHARGE SUMMARY
[facility] PSYCHIATRIC CENTER HOSPITALIST  DISCHARGE SUMMARY     Zaida El Patient Status:  Observation    12/15/1931 MRN [id number]   [facility] 8NE-A Attending Jenn Cline, [address] [facility] Day # 0 PCP Herrera Brady DO     Date of Admission: 3/9/2021  Contreras alendronate 70 MG Tabs  Commonly known as: FOSAMAX      Take 70 mg by mouth once a week. On saturdays   Refills: 0     Arginaid Pack      Take 1 Package by mouth 3 (three) times daily.    Refills: 0     bicalutamide 50 MG Tabs  Commonly known as: CASODEX mouth 2 (two) times daily. Refills: 0     zinc sulfate 220 (50 Zn) MG Caps  Commonly known as: ZINCATE      Take 220 mg by mouth daily.    Refills: 0        STOP taking these medications    ALPRAZolam 0.25 MG Tabs  Commonly known as: Annika Sims

## 2021-03-11 NOTE — PROGRESS NOTES
03/11/21 1251   Clinical Encounter Type   Visited With Health care provider   Routine Visit   (Responded to consult to scan a copy of the POLST received)    scanned a copy of the POLST form, dated 3/5/21, as received, into patient's electronic R

## 2021-03-11 NOTE — PLAN OF CARE
Assumed care of patient at 0730. Alert and oriented to self and time, forgetful. Tele rhythm NSR. On room air. Breath sounds clear bilaterally. Bed locked and in low position, bed alarm on. Call light and personal items within reach.  No c/o chest pain, sob measures for life threatening arrhythmias  - Monitor electrolytes and administer replacement therapy as ordered  Outcome: Progressing     Problem: RESPIRATORY - ADULT  Goal: Achieves optimal ventilation and oxygenation  Description: INTERVENTIONS:  - Asses and rhythm and assess patient for signs and symptoms of electrolyte imbalances  - Administer electrolyte replacement as ordered  - Monitor response to electrolyte replacements, including rhythm and repeat lab results as appropriate  - Fluid restriction as Encourage toileting schedule  Outcome: Progressing     Problem: Risk for Infection  Goal: Absence of fever/infection during anticipated neutropenic period  Description: INTERVENTIONS:  - Monitor WBC  - Implement neutropenic guidelines  Outcome: Progressing

## 2021-03-11 NOTE — CM/SW NOTE
03/11/21 1209   Discharge disposition   Expected discharge disposition Skilled Nurs   Name of Facillity/Home Care/Hospice   ([facility])   Discharge transportation [facility]     Pt ok to [place] today to [facility] [address]

## 2021-03-11 NOTE — CM/SW NOTE
03/11/21 1100   CM/SW Referral Data   Referral Source Physician   Reason for Referral Discharge planning   Informant Patient; Children;Friend;Edward Staff     Pt had recent admission to [facility] and went to [facility] on 3/4.   Pt readmitted due t

## 2021-03-11 NOTE — PLAN OF CARE
Problem: Patient/Family Goals  Goal: Patient/Family Long Term Goal  Description: Patient's Long Term Goal: \"decreased hospital admissions\"    Interventions:  - Follow ups  - Activity as tolerated  -Adhere to medication regimen    - See additional Care effort  - Oxygen supplementation based on oxygen saturation or ABGs  - Provide Smoking Cessation handout, if applicable  - Encourage broncho-pulmonary hygiene including cough, deep breathe, Incentive Spirometry  - Assess the need for suctioning and perform

## 2021-03-11 NOTE — DIETARY NOTE
[facility]    NUTRITION ASSESSMENT    Pt meets severe malnutrition criteria.     CRITERIA FOR MALNUTRITION DIAGNOSIS:  Criteria for severe malnutrition diagnosis: acute illness/injury related to wt loss greater than 5% in 1 month, energy intake less th general  Oral Supplements: Enlive BID    FOOD/NUTRITION RELATED HISTORY:  Appetite: Poor  Intake: <25%  Intake Meeting Needs: Marginal, added supplements  Food Allergies: No  Cultural/Ethnic/Sikhism Preferences Addresses: Yes    NUTRITION RELATED PHYSICA

## 2022-09-19 NOTE — TELEPHONE ENCOUNTER
Last ov was 8/7/18  Last refill trazodone 5/21/18 <--- Click to Launch ICDx for PreOp, PostOp and Procedure

## 2022-12-07 ENCOUNTER — HOSPITAL ENCOUNTER (INPATIENT)
Facility: HOSPITAL | Age: 87
LOS: 2 days | Discharge: NURSING, REHAB OR LONG-TERM CARE | End: 2022-12-10
Attending: EMERGENCY MEDICINE | Admitting: HOSPITALIST
Payer: MEDICARE

## 2022-12-07 ENCOUNTER — APPOINTMENT (OUTPATIENT)
Dept: CT IMAGING | Facility: HOSPITAL | Age: 87
End: 2022-12-07
Attending: EMERGENCY MEDICINE
Payer: MEDICARE

## 2022-12-07 DIAGNOSIS — I95.9 HYPOTENSION, UNSPECIFIED HYPOTENSION TYPE: ICD-10-CM

## 2022-12-07 DIAGNOSIS — K59.00 CONSTIPATION, UNSPECIFIED CONSTIPATION TYPE: Primary | ICD-10-CM

## 2022-12-07 DIAGNOSIS — R31.9 HEMATURIA, UNSPECIFIED TYPE: ICD-10-CM

## 2022-12-07 LAB
ALBUMIN SERPL-MCNC: 3.5 G/DL (ref 3.4–5)
ALBUMIN/GLOB SERPL: 1 {RATIO} (ref 1–2)
ALP LIVER SERPL-CCNC: 66 U/L
ALT SERPL-CCNC: 13 U/L
ANION GAP SERPL CALC-SCNC: 5 MMOL/L (ref 0–18)
ANTIBODY SCREEN: NEGATIVE
AST SERPL-CCNC: 13 U/L (ref 15–37)
BASOPHILS # BLD AUTO: 0.02 X10(3) UL (ref 0–0.2)
BASOPHILS NFR BLD AUTO: 0.2 %
BILIRUB SERPL-MCNC: 0.7 MG/DL (ref 0.1–2)
BILIRUB UR QL STRIP.AUTO: NEGATIVE
BUN BLD-MCNC: 26 MG/DL (ref 7–18)
CALCIUM BLD-MCNC: 9.4 MG/DL (ref 8.5–10.1)
CHLORIDE SERPL-SCNC: 105 MMOL/L (ref 98–112)
CHOLEST SERPL-MCNC: 139 MG/DL (ref ?–200)
CO2 SERPL-SCNC: 27 MMOL/L (ref 21–32)
CREAT BLD-MCNC: 1.03 MG/DL
EOSINOPHIL # BLD AUTO: 0.01 X10(3) UL (ref 0–0.7)
EOSINOPHIL NFR BLD AUTO: 0.1 %
ERYTHROCYTE [DISTWIDTH] IN BLOOD BY AUTOMATED COUNT: 13.4 %
GFR SERPLBLD BASED ON 1.73 SQ M-ARVRAT: 69 ML/MIN/1.73M2 (ref 60–?)
GLOBULIN PLAS-MCNC: 3.6 G/DL (ref 2.8–4.4)
GLUCOSE BLD-MCNC: 114 MG/DL (ref 70–99)
GLUCOSE UR STRIP.AUTO-MCNC: NEGATIVE MG/DL
HCT VFR BLD AUTO: 40.4 %
HDLC SERPL-MCNC: 56 MG/DL (ref 40–59)
HGB BLD-MCNC: 13.3 G/DL
IMM GRANULOCYTES # BLD AUTO: 0.08 X10(3) UL (ref 0–1)
IMM GRANULOCYTES NFR BLD: 0.6 %
LACTATE SERPL-SCNC: 1 MMOL/L (ref 0.4–2)
LDLC SERPL CALC-MCNC: 70 MG/DL (ref ?–100)
LYMPHOCYTES # BLD AUTO: 0.31 X10(3) UL (ref 1–4)
LYMPHOCYTES NFR BLD AUTO: 2.5 %
MCH RBC QN AUTO: 30.6 PG (ref 26–34)
MCHC RBC AUTO-ENTMCNC: 32.9 G/DL (ref 31–37)
MCV RBC AUTO: 92.9 FL
MONOCYTES # BLD AUTO: 0.51 X10(3) UL (ref 0.1–1)
MONOCYTES NFR BLD AUTO: 4.1 %
NEUTROPHILS # BLD AUTO: 11.5 X10 (3) UL (ref 1.5–7.7)
NEUTROPHILS # BLD AUTO: 11.5 X10(3) UL (ref 1.5–7.7)
NEUTROPHILS NFR BLD AUTO: 92.5 %
NITRITE UR QL STRIP.AUTO: NEGATIVE
NONHDLC SERPL-MCNC: 83 MG/DL (ref ?–130)
OSMOLALITY SERPL CALC.SUM OF ELEC: 290 MOSM/KG (ref 275–295)
PH UR STRIP.AUTO: 7 [PH] (ref 5–8)
PLATELET # BLD AUTO: 206 10(3)UL (ref 150–450)
POTASSIUM SERPL-SCNC: 4 MMOL/L (ref 3.5–5.1)
PROT SERPL-MCNC: 7.1 G/DL (ref 6.4–8.2)
PROT UR STRIP.AUTO-MCNC: >=300 MG/DL
RBC # BLD AUTO: 4.35 X10(6)UL
RBC #/AREA URNS AUTO: >10 /HPF
RBC #/AREA URNS AUTO: >10 /HPF
RH BLOOD TYPE: POSITIVE
SARS-COV-2 RNA RESP QL NAA+PROBE: NOT DETECTED
SODIUM SERPL-SCNC: 137 MMOL/L (ref 136–145)
SP GR UR STRIP.AUTO: 1.02 (ref 1–1.03)
TRIGL SERPL-MCNC: 63 MG/DL (ref 30–149)
TROPONIN I HIGH SENSITIVITY: 344 NG/L
UROBILINOGEN UR STRIP.AUTO-MCNC: 1 MG/DL
VLDLC SERPL CALC-MCNC: 10 MG/DL (ref 0–30)
WBC # BLD AUTO: 12.4 X10(3) UL (ref 4–11)
WBC #/AREA URNS AUTO: >50 /HPF
WBC #/AREA URNS AUTO: >50 /HPF

## 2022-12-07 PROCEDURE — 0DCP7ZZ EXTIRPATION OF MATTER FROM RECTUM, VIA NATURAL OR ARTIFICIAL OPENING: ICD-10-PCS | Performed by: EMERGENCY MEDICINE

## 2022-12-07 PROCEDURE — 99291 CRITICAL CARE FIRST HOUR: CPT | Performed by: HOSPITALIST

## 2022-12-07 PROCEDURE — 74176 CT ABD & PELVIS W/O CONTRAST: CPT | Performed by: EMERGENCY MEDICINE

## 2022-12-07 RX ORDER — ASPIRIN 81 MG/1
324 TABLET, CHEWABLE ORAL ONCE
Status: COMPLETED | OUTPATIENT
Start: 2022-12-07 | End: 2022-12-07

## 2022-12-07 RX ORDER — SODIUM CHLORIDE 9 MG/ML
1000 INJECTION, SOLUTION INTRAVENOUS CONTINUOUS
Status: DISCONTINUED | OUTPATIENT
Start: 2022-12-07 | End: 2022-12-08

## 2022-12-07 NOTE — ED QUICK NOTES
Assisted MD for fecal disimpaction, patient tolerated well.  Medicated w/ suppository per [facility]

## 2022-12-07 NOTE — ED INITIAL ASSESSMENT (HPI)
Pt to ED by ambulance. It appears the complaints is for pt pulling out his catheter. Pt is complaining that he is having rectal pain and bleeding. PT states it started this morning and is bright red blood. Pt is AOx2-3, would recommend calling facility for further information on why patient was sent in. Pt is not on a blood thinner.

## 2022-12-08 ENCOUNTER — APPOINTMENT (OUTPATIENT)
Dept: CV DIAGNOSTICS | Facility: HOSPITAL | Age: 87
End: 2022-12-08
Attending: HOSPITALIST
Payer: MEDICARE

## 2022-12-08 PROBLEM — R31.9 HEMATURIA, UNSPECIFIED TYPE: Status: ACTIVE | Noted: 2022-12-08

## 2022-12-08 LAB
ANION GAP SERPL CALC-SCNC: 5 MMOL/L (ref 0–18)
APTT PPP: 33.8 SECONDS (ref 23.3–35.6)
ATRIAL RATE: 92 BPM
BUN BLD-MCNC: 22 MG/DL (ref 7–18)
CALCIUM BLD-MCNC: 8.4 MG/DL (ref 8.5–10.1)
CHLORIDE SERPL-SCNC: 109 MMOL/L (ref 98–112)
CO2 SERPL-SCNC: 27 MMOL/L (ref 21–32)
CREAT BLD-MCNC: 0.95 MG/DL
ERYTHROCYTE [DISTWIDTH] IN BLOOD BY AUTOMATED COUNT: 13.7 %
ERYTHROCYTE [DISTWIDTH] IN BLOOD BY AUTOMATED COUNT: 13.9 %
GFR SERPLBLD BASED ON 1.73 SQ M-ARVRAT: 76 ML/MIN/1.73M2 (ref 60–?)
GLUCOSE BLD-MCNC: 89 MG/DL (ref 70–99)
HCT VFR BLD AUTO: 33 %
HCT VFR BLD AUTO: 35.5 %
HGB BLD-MCNC: 10.6 G/DL
HGB BLD-MCNC: 11.1 G/DL
INR BLD: 1.11 (ref 0.85–1.16)
MAGNESIUM SERPL-MCNC: 2.4 MG/DL (ref 1.6–2.6)
MCH RBC QN AUTO: 30.2 PG (ref 26–34)
MCH RBC QN AUTO: 30.6 PG (ref 26–34)
MCHC RBC AUTO-ENTMCNC: 31.3 G/DL (ref 31–37)
MCHC RBC AUTO-ENTMCNC: 32.1 G/DL (ref 31–37)
MCV RBC AUTO: 95.4 FL
MCV RBC AUTO: 96.7 FL
OSMOLALITY SERPL CALC.SUM OF ELEC: 295 MOSM/KG (ref 275–295)
P AXIS: 45 DEGREES
P-R INTERVAL: 166 MS
PLATELET # BLD AUTO: 142 10(3)UL (ref 150–450)
PLATELET # BLD AUTO: 177 10(3)UL (ref 150–450)
POTASSIUM SERPL-SCNC: 4 MMOL/L (ref 3.5–5.1)
PROCALCITONIN SERPL-MCNC: 6.95 NG/ML (ref ?–0.16)
PROTHROMBIN TIME: 14.3 SECONDS (ref 11.6–14.8)
Q-T INTERVAL: 366 MS
QRS DURATION: 90 MS
QTC CALCULATION (BEZET): 452 MS
R AXIS: -70 DEGREES
RBC # BLD AUTO: 3.46 X10(6)UL
RBC # BLD AUTO: 3.67 X10(6)UL
SODIUM SERPL-SCNC: 141 MMOL/L (ref 136–145)
T AXIS: 12 DEGREES
TROPONIN I HIGH SENSITIVITY: 343 NG/L
TROPONIN I HIGH SENSITIVITY: 433 NG/L
VENTRICULAR RATE: 92 BPM
WBC # BLD AUTO: 11.6 X10(3) UL (ref 4–11)
WBC # BLD AUTO: 6.8 X10(3) UL (ref 4–11)

## 2022-12-08 PROCEDURE — 93306 TTE W/DOPPLER COMPLETE: CPT | Performed by: HOSPITALIST

## 2022-12-08 PROCEDURE — 99291 CRITICAL CARE FIRST HOUR: CPT | Performed by: NURSE PRACTITIONER

## 2022-12-08 PROCEDURE — 99232 SBSQ HOSP IP/OBS MODERATE 35: CPT | Performed by: HOSPITALIST

## 2022-12-08 RX ORDER — TRAZODONE HYDROCHLORIDE 50 MG/1
50 TABLET ORAL NIGHTLY
Status: DISCONTINUED | OUTPATIENT
Start: 2022-12-08 | End: 2022-12-10

## 2022-12-08 RX ORDER — MIRTAZAPINE 15 MG/1
30 TABLET, FILM COATED ORAL NIGHTLY
Status: DISCONTINUED | OUTPATIENT
Start: 2022-12-08 | End: 2022-12-10

## 2022-12-08 RX ORDER — ACETAMINOPHEN 500 MG
1000 TABLET ORAL EVERY 4 HOURS PRN
Status: DISCONTINUED | OUTPATIENT
Start: 2022-12-08 | End: 2022-12-10

## 2022-12-08 RX ORDER — SENNOSIDES 8.6 MG
8.6 TABLET ORAL 2 TIMES DAILY
Status: DISCONTINUED | OUTPATIENT
Start: 2022-12-08 | End: 2022-12-10

## 2022-12-08 RX ORDER — MELATONIN
325
Status: DISCONTINUED | OUTPATIENT
Start: 2022-12-08 | End: 2022-12-10

## 2022-12-08 RX ORDER — FAMOTIDINE 20 MG/1
20 TABLET, FILM COATED ORAL 2 TIMES DAILY
Status: DISCONTINUED | OUTPATIENT
Start: 2022-12-08 | End: 2022-12-10

## 2022-12-08 RX ORDER — ONDANSETRON 2 MG/ML
4 INJECTION INTRAMUSCULAR; INTRAVENOUS EVERY 6 HOURS PRN
Status: DISCONTINUED | OUTPATIENT
Start: 2022-12-08 | End: 2022-12-10

## 2022-12-08 RX ORDER — METOCLOPRAMIDE HYDROCHLORIDE 5 MG/ML
10 INJECTION INTRAMUSCULAR; INTRAVENOUS EVERY 8 HOURS PRN
Status: DISCONTINUED | OUTPATIENT
Start: 2022-12-08 | End: 2022-12-10

## 2022-12-08 RX ORDER — POLYETHYLENE GLYCOL 3350 17 G/17G
17 POWDER, FOR SOLUTION ORAL DAILY
Status: DISCONTINUED | OUTPATIENT
Start: 2022-12-08 | End: 2022-12-10

## 2022-12-08 RX ORDER — SODIUM CHLORIDE, SODIUM LACTATE, POTASSIUM CHLORIDE, CALCIUM CHLORIDE 600; 310; 30; 20 MG/100ML; MG/100ML; MG/100ML; MG/100ML
INJECTION, SOLUTION INTRAVENOUS CONTINUOUS
Status: DISCONTINUED | OUTPATIENT
Start: 2022-12-08 | End: 2022-12-10

## 2022-12-08 RX ORDER — ECHINACEA PURPUREA EXTRACT 125 MG
1 TABLET ORAL
Status: DISCONTINUED | OUTPATIENT
Start: 2022-12-08 | End: 2022-12-10

## 2022-12-08 RX ORDER — MELATONIN
3 NIGHTLY PRN
Status: DISCONTINUED | OUTPATIENT
Start: 2022-12-08 | End: 2022-12-10

## 2022-12-08 RX ORDER — BICALUTAMIDE 50 MG/1
50 TABLET, FILM COATED ORAL EVERY EVENING
Status: DISCONTINUED | OUTPATIENT
Start: 2022-12-08 | End: 2022-12-10

## 2022-12-08 RX ORDER — ALBUMIN (HUMAN) 12.5 G/50ML
25 SOLUTION INTRAVENOUS ONCE
Status: COMPLETED | OUTPATIENT
Start: 2022-12-08 | End: 2022-12-08

## 2022-12-08 RX ORDER — OXYBUTYNIN CHLORIDE 5 MG/1
10 TABLET, EXTENDED RELEASE ORAL DAILY
Status: DISCONTINUED | OUTPATIENT
Start: 2022-12-08 | End: 2022-12-10

## 2022-12-08 NOTE — PLAN OF CARE
Received pt from ED. Pt with bleeding from penis due to chronic duron removal. Duron replaced in ED. Pt was on levo at this time. Pt had one soft stool upon arrival from ED. Levo was weaned off. Pt was given senna/ golytely. Pt drank 2.5 cups of the golytely. However, he stated, \"I will throw up if I drink anymore. I will do it later\". After multiple attempts to get the patient to be complaint, he refused. Urine in duron bag remains bloody but is clearing up since ED. Will continue to monitor. See MAR/ flowsheets for additional information.

## 2022-12-08 NOTE — ED QUICK NOTES
Pt resting comfortably on stretcher. Call light within reach. Updated pt that room is still being cleaned.

## 2022-12-08 NOTE — PROGRESS NOTES
12/08/22 1434   Clinical Encounter Type   Visited With Patient   Patient Spiritual Encounters   Spiritual Assessment Completed Yes   Taxonomy   Intended Effects Demonstrate caring and concern   Methods Offer support;Explore fifi and values; Encourage sharing of feelings   Interventions Acknowledge current situation; Active listening; Ask guided questions; Explain  role;[place]      responded to request for spiritual care visit.  provided spiritual/emotional support to patient through active listening and prayer as patient reflected on his life. Patient shared strong fifi in God.  affirmed God's faithfulness in current situation. Patient also expressed grooming needs.  shared with nurse. Patient showed appreciation for visit.  remains available for support. Spiritual Care support can be requested via an Epic consult. BECKY Espin[facility] Div  Extension: [phone]

## 2022-12-09 LAB
ANION GAP SERPL CALC-SCNC: 8 MMOL/L (ref 0–18)
BUN BLD-MCNC: 18 MG/DL (ref 7–18)
CALCIUM BLD-MCNC: 8 MG/DL (ref 8.5–10.1)
CHLORIDE SERPL-SCNC: 109 MMOL/L (ref 98–112)
CO2 SERPL-SCNC: 24 MMOL/L (ref 21–32)
CREAT BLD-MCNC: 0.78 MG/DL
ERYTHROCYTE [DISTWIDTH] IN BLOOD BY AUTOMATED COUNT: 13.7 %
GFR SERPLBLD BASED ON 1.73 SQ M-ARVRAT: 85 ML/MIN/1.73M2 (ref 60–?)
GLUCOSE BLD-MCNC: 89 MG/DL (ref 70–99)
HCT VFR BLD AUTO: 35.7 %
HGB BLD-MCNC: 11.3 G/DL
K PNEUMON DNA BLD POS QL NAA+NON-PROBE: DETECTED
MCH RBC QN AUTO: 30.7 PG (ref 26–34)
MCHC RBC AUTO-ENTMCNC: 31.7 G/DL (ref 31–37)
MCV RBC AUTO: 97 FL
OSMOLALITY SERPL CALC.SUM OF ELEC: 293 MOSM/KG (ref 275–295)
PLATELET # BLD AUTO: 138 10(3)UL (ref 150–450)
POTASSIUM SERPL-SCNC: 4.1 MMOL/L (ref 3.5–5.1)
RBC # BLD AUTO: 3.68 X10(6)UL
SODIUM SERPL-SCNC: 141 MMOL/L (ref 136–145)
WBC # BLD AUTO: 4.9 X10(3) UL (ref 4–11)

## 2022-12-09 PROCEDURE — 99232 SBSQ HOSP IP/OBS MODERATE 35: CPT | Performed by: HOSPITALIST

## 2022-12-09 RX ORDER — CEFAZOLIN SODIUM/WATER 2 G/20 ML
2 SYRINGE (ML) INTRAVENOUS EVERY 8 HOURS
Status: DISCONTINUED | OUTPATIENT
Start: 2022-12-09 | End: 2022-12-10

## 2022-12-09 NOTE — PLAN OF CARE
Received pt from night shift nurse at 0700. Pt alert and awake, pleasantly confused, obeying commands and moving all extremities. Pt has a very limited short term memory and repeats questions. Pt no longer needs blood pressure support medication and is on room air. He is hemodynamically stable with no complaints of pain. Bedside echo showed EF of 60-65%. Trop trending down. Urine culture was positive was gram negative rods and klebsiella pneumonia. Pt did spike a fever tmax 100.6. He was given oral tylenol and it came down. Speech saw him and cleared him for bite sized diet due to him not have his dentures here. Diaz remains in due to chronic retention and neurogenic bladder. Good output of urine,  Camilla colored with blood tinge. Pt did have two BMs during shift. The first was formed and green. The second was loose and black tarry looking. APN notified. Labs pending. Pt updated on POC and verbalized understanding. Will continue to monitor.

## 2022-12-09 NOTE — CDS QUERY
Clarification - Condition Associated with Device, Implant or Graft  CLINICAL Suhail Barragan  Dear Doctor:  Clinical information (provided below) suggests involvement of a device, implant or graft. For accurate ICD-10-CM code assignment to reflect severity of illness and risk of mortality,  IS URINARY TRACT INFECTION A COMPLICATION OF CHRONIC CRUZ CATHETER? SELECTION BY PROVIDER ONLY    ( X ) Yes, it is a complication of the device, implant or graft. (  ) No, it is not a complication of the device, implant or graft. (  ) Other (please specify):     (  ) Unable to Determine (please comment)       CLINICAL INFORMATION FROM THE MEDICAL RECORD  H&P: Cruz cath was replaced and he had some mild hematuria consistent with likely urethral trauma. During his ER stay he started develop progressive hypotension despite multiple IV fluid boluses. He will be started on pressors. Urinalysis is abnormal and he was started antibiotics. 12/8 consult:  with chronic indwelling cruz, chronic constipation who presented to the ED  after pulling out his catheter. UA was suggestive of infection therefore he was started on abx and admitted to the ICU for management of his hypotension. 12/9 Pn: hypotension d/t urosepsis from klebsiella uti; Klebsiella uti; BPH with chronic cruz - Replaced prior to admit      If you have any questions, please contact Clinical  Sadi Horvath RN, CCDS  at #[phone]. Thank You!     THIS FORM IS A PERMANENT PART OF THE MEDICAL RECORD

## 2022-12-09 NOTE — PLAN OF CARE
Received patient following report. Patient alert. Confused at baseline. Follows commands. Hemodynamically stable. Room air. Afebrile. Diaz intact, draining dark red/delfino. 2 smear BM, black/green. Pills given in applesauce, tolerated well. Repeat blood cx drawn. IVF infusing. See flowsheets and MAR.

## 2022-12-10 VITALS
WEIGHT: 130.75 LBS | HEART RATE: 79 BPM | HEIGHT: 68 IN | DIASTOLIC BLOOD PRESSURE: 69 MMHG | OXYGEN SATURATION: 95 % | RESPIRATION RATE: 24 BRPM | BODY MASS INDEX: 19.82 KG/M2 | TEMPERATURE: 98 F | SYSTOLIC BLOOD PRESSURE: 103 MMHG

## 2022-12-10 LAB
ANION GAP SERPL CALC-SCNC: 6 MMOL/L (ref 0–18)
BUN BLD-MCNC: 15 MG/DL (ref 7–18)
CALCIUM BLD-MCNC: 8 MG/DL (ref 8.5–10.1)
CHLORIDE SERPL-SCNC: 108 MMOL/L (ref 98–112)
CO2 SERPL-SCNC: 25 MMOL/L (ref 21–32)
CREAT BLD-MCNC: 0.84 MG/DL
ERYTHROCYTE [DISTWIDTH] IN BLOOD BY AUTOMATED COUNT: 13.4 %
GFR SERPLBLD BASED ON 1.73 SQ M-ARVRAT: 83 ML/MIN/1.73M2 (ref 60–?)
GLUCOSE BLD-MCNC: 87 MG/DL (ref 70–99)
HCT VFR BLD AUTO: 35.9 %
HGB BLD-MCNC: 11.3 G/DL
K PNEUMON DNA BLD POS QL NAA+NON-PROBE: DETECTED
MCH RBC QN AUTO: 31 PG (ref 26–34)
MCHC RBC AUTO-ENTMCNC: 31.5 G/DL (ref 31–37)
MCV RBC AUTO: 98.6 FL
OSMOLALITY SERPL CALC.SUM OF ELEC: 288 MOSM/KG (ref 275–295)
PLATELET # BLD AUTO: 102 10(3)UL (ref 150–450)
POTASSIUM SERPL-SCNC: 4.2 MMOL/L (ref 3.5–5.1)
RBC # BLD AUTO: 3.64 X10(6)UL
SARS-COV-2 RNA RESP QL NAA+PROBE: NOT DETECTED
SODIUM SERPL-SCNC: 139 MMOL/L (ref 136–145)
WBC # BLD AUTO: 4.6 X10(3) UL (ref 4–11)

## 2022-12-10 PROCEDURE — 99239 HOSP IP/OBS DSCHRG MGMT >30: CPT | Performed by: HOSPITALIST

## 2022-12-10 RX ORDER — CIPROFLOXACIN 500 MG/1
500 TABLET, FILM COATED ORAL 2 TIMES DAILY
Qty: 20 TABLET | Refills: 0 | Status: SHIPPED | OUTPATIENT
Start: 2022-12-10 | End: 2022-12-20

## 2022-12-10 NOTE — PLAN OF CARE
Received pt from night shift nurse at 0700. Pt awake and alert, pleasantly confused at baseline. He can follow commands and move all extremities. He was up to the chair with one assist by pivot but only stayed in in about 30 minutes. He is on room air with good sats, NSR on monitor, blood pressure normotensive in the 120s. Pt remained afebrile all day. Blood cultures came back positive for klebsiella pneumonia. Abx continue. Pt had two soft BMs, dark green/ tarry black. He did try to sit on the bedside commode for the BMs but couldn't do anything. He put out close to 800mL of urine via duron. He denied any pain all day but was anxious about being alone. Pt has limited appetite. Iv dressings changed on right arm IVs. Transfer orders. Pt updated on POC.

## 2022-12-10 NOTE — PROGRESS NOTES
[facility] Pharmacy Note:  Renal Adjustment for cefazolin (ANCEF)    Keren Gonsalves is a 80year old patient who has been prescribed cefazolin (ANCEF) 1 g IV every 8 hrs. The estimated creatinine clearance is 52.8 mL/min (based on SCr of 0.78 mg/dL). The dose has been adjusted to cefazolin (ANCEF) 2 g IV every 8 hrs per hospital renal dose adjustment protocol for treatment of UTI. Also 1 of 2 blood cultures detected Klebsiella pneumoniae by PCR. Pharmacy will follow and adjust dose as warranted.     Thank you,    Bert De La Cruz, PharmD  12/9/2022  7:23 PM

## 2022-12-10 NOTE — CM/SW NOTE
12/10/22 1200   Discharge disposition   Expected discharge disposition [facility] Provider [facility]   Discharge transportation [facility]       Pt cleared to return to [facility] today. Confirmed plans w/ Larisa Ports at facility. Rapid covid in progress. BLS set up for 4:00pm. PCS form completed. RN to update pt/family of plan.     [facility]: [phone]

## 2022-12-10 NOTE — PLAN OF CARE
Assumed care of pt after RN report. Alert, oriented to self. Denies pain. Tolerating diet. Chronic duron with clear, yellow urine. VSS. NSR with PVCs. Room air. POC discussed with pt, needs reinforcement. See flowsheets for full assessments. Ok to [employer] home.

## 2022-12-10 NOTE — PLAN OF CARE
Received report from day shift, pt resting in bed with eyes closed. Arouses easily to verbal stimuli. VSS, afebrile. Denies pain. Repeats a story to staff repeatedly. Takes oral meds with applesauce without difficulty. IVF infusing. Discussed, transfer orders and use of the call light, but will plan on frequent nursing rounds.

## 2022-12-10 NOTE — PLAN OF CARE
Pt discharged to [facility] at this time via ambulance. Report given to RN at [facility]. Left in stable condition.

## 2022-12-12 ENCOUNTER — PATIENT OUTREACH (OUTPATIENT)
Dept: CASE MANAGEMENT | Age: 87
End: 2022-12-12

## 2023-08-29 NOTE — TELEPHONE ENCOUNTER
Central Prior Authorization Team   Phone: [phone]    PA Initiation    Medication: Semaglutide (RYBELSUS) 3 MG tablet  Insurance Company: [employer] - Phone [phone] Fax [phone]  Pharmacy Filling the Rx: [id number] [place], [place] - [address] [facility] [address]  Filling Pharmacy Phone: [phone]  Filling Pharmacy Fax:    Start Date: 8/29/2023           Last ov was 4/25/18  Last refill bicalutamide 3/2/18

## 2024-02-07 NOTE — ED AVS SNAPSHOT
Likely sundowning happened this morning early morning  CT head was done-   BP on lower side with orthostats positive-      Reuben Boast   MRN: [id number]    Department:  [facility] Emergency Department   Date of Visit:  11/15/2018           Disclosure     Insurance plans vary and the physician(s) referred by the ER may not be covered by your plan.  Please contact tell this physician (or your personal doctor if your instructions are to return to your personal doctor) about any new or lasting problems. The primary care or specialist physician will see patients referred from the [facility] Emergency Department.  Charo Gray

## 2024-03-03 NOTE — ED NOTES
Round on pt. Pt back in room from CT. No distress noted. Pt A/Ox3. C/o abd pain.  Pt updated on eta for CT results 03-Mar-2024 21:17

## 2024-07-25 NOTE — TELEPHONE ENCOUNTER
Occupational Therapy NICU Follow Up Clinic    Evaluation     Visit Count: 1     Referred by: Vikki Daniel MD   Medical Diagnosis (from order):   Diagnosis Information        Diagnosis    P07.35 (ICD-10-CM) - Premature infant of 32 weeks gestation (CMD)    M62.89 (ICD-10-CM) - Hypotonia    F82 (ICD-10-CM) - Motor delay                  Treatment Diagnosis:  evaluation for developmental milestones     Date of Onset: birth  and development  Chart reviewed at time of initial: Relevant co-morbidities, allergies, tests and medications:  Chart reviewed by therapist. See medical chart for most current information.     SUBJECTIVE    Present and reporting subjective information: father, mother, and triplet siblings    Reported Current Functional Level: 50-75th percentile at last clinic visit    Personal Occupations Profile Affected: functional mobility/transfers, play/play participation  Patient/Family Goals: Understand how patient is progressing with skills. Reports no specific concerns.     Current Therapy: none, reports they were in touch with Early Intervention but never scheduled.     Prior Treatment:  none  in the past year for current condition.   Hospitalization or home health services in the last 30 days: No, per patient.  Home Environment/Social Support: Patient is a minor and has support at home as needed.  Do you feel safe at home, work and/or school? yes, per patient  Falls: balance history in last year (< or equal to 2 falls/near falls and/or reasons) does not indicate further testing    OBJECTIVE   Chronological Age:24 month old optional  Adjusted Age: 22m  optional    Standardized Assessments:  Rosario Scales of Learning   Scale Raw Score Percentile Rank Descriptive Category Age Equivalent    Gross Motor 26 58 Average  23 mo   Fine Motor 26 73 Average 26 mo        Gross Motor Milestones:    2 years   Observed Caregiver Reported Comments   Walks up steps with support  x    Walks down steps with support  x   Pt has scheduled a 4-25-18 OV JG.   Traps a large ball x x    Jumps clearing both feet x x    Kicks a ball forward x x    \"X\" in observed cell indicates the skill was seen in clinic, X in reported cell indicates the caregiver present reported observing the skill at home    Fine Motor Milestones    2 years   Observed Caregiver Reported Comments   Imitates drawing vertical line x x    Imitates drawing horizontal line x x    Imitates drawing circular scribble      Uses pads of finger trips to grasp pencil x x    \"X\" in observed cell indicates the skill was seen in clinic, X in reported cell indicates the caregiver present reported observing the skill at home    Muscle Tone:   within normal limits    Posture Analysis  head in midline, shoulders level, and trunk symmetrical.  Observed in all developmental positions.    Sensory Processing and Self Regulation:   Appropriate Sensory Processing Skills     Vision:  establishes visual regard  visually tracking objects to either side  visually tracking objects upward  visually tracking objects downward    Grasp    small object:pincer grasp  Grasp to hold a cube: radial digital  Hold a writing utensil: used palmar supinate and digital pronate    Self Help  Participates in dressing  Can remove simple clothing    Behavioral Observations:   an appropriate attention span, an appropriate activity level, appropriate joint attention, and inconsistent eye contact, appropriately distracted    Home Program:  Family education and home program provided regarding:   education on gross motor development and education on fine motor development    Suggestions for next session as indicated: progress per plan of care    Recommendations:   Return to clinic at next planned visit    ASSESSMENT   Tania is a  female 24 month old (24 mo adjusted age)  patient presenting to therapy for a evaluation with occupational therapy  in NICU Follow up clinic.    Gross motor skills are: within typical limits  Fine motor skills are: within  typical limits    Patient benefited from skilled therapy in clinic today.     Result of initial outlined education: Verbalizes understanding    PLAN   Goals: To be obtained by end of this plan of care:  1.Complete clinic evaluation and make appropriate referrals.-Goal Met   2. Provide activities for home.-Met    The following skilled interventions to be implemented to achieve above:  No interventions indicated at this time     Frequency/Duration: Patient seen one time for assessment and recommendations.    Caregiver involved in and agreed to plan of care and goals.          Evaluation Procedures:  Occupational Therapy Evaluation: Low Complexity    Timed Procedures:  No timed codes were used on this date of service    Untimed Procedures:  No untimed codes were used on this date of service    Total Treatment Time: 35 minutes          Therapy procedure time and total treatment time can be found documented on the Time Entry flowsheet

## (undated) DEVICE — DRESSING AQUACEL AG 3.5 X 10

## (undated) DEVICE — MLPD DISPOSABLE PAD (6' ROLL) 3 ROLLS: Brand: SCHAERER MEDICAL USA

## (undated) DEVICE — BLADE ELECTRODE: Brand: EDGE

## (undated) DEVICE — SUTURE FIBERWIRE 2 AR-7202

## (undated) DEVICE — KENDALL SCD EXPRESS SLEEVES, KNEE LENGTH, MEDIUM: Brand: KENDALL SCD

## (undated) DEVICE — Device: Brand: POWER-FLO®

## (undated) DEVICE — GLOVE SURG TRIUMPH SZ 8-1/2

## (undated) DEVICE — PILLOW ABDUCTION HIP SM

## (undated) DEVICE — SPONGE RAYTEC 4X4 RF DETECT

## (undated) DEVICE — DRAPE,U/SHT,SPLIT,FILM,60X84,STERILE: Brand: MEDLINE

## (undated) DEVICE — GLOVE ALOETOUCH ORTHO SZ 8

## (undated) DEVICE — SUTURE VICRYL 2-0 CT-1

## (undated) DEVICE — TOTAL HIP CDS: Brand: MEDLINE INDUSTRIES, INC.

## (undated) DEVICE — SPECIMEN CONTAINER,POSITIVE SEAL INDICATOR, OR PACKAGED: Brand: PRECISION

## (undated) DEVICE — VIOLET BRAIDED (POLYGLACTIN 910), SYNTHETIC ABSORBABLE SUTURE: Brand: COATED VICRYL

## (undated) DEVICE — INSTRUMENT FEE

## (undated) DEVICE — DECANTER BAG 9": Brand: MEDLINE INDUSTRIES, INC.

## (undated) DEVICE — Device: Brand: STABLECUT®

## (undated) DEVICE — STERILE POLYISOPRENE POWDER-FREE SURGICAL GLOVES: Brand: PROTEXIS

## (undated) DEVICE — WRAP COOLING HIP W/ICE PILLOW

## (undated) DEVICE — PIN STEINMAN SMOOTH 1/8 9

## (undated) DEVICE — 3M™ COBAN™ NL STERILE NON-LATEX SELF-ADHERENT WRAP, 2084S, 4 IN X 5 YD (10 CM X 4,5 M), 18 ROLLS/CASE: Brand: 3M™ COBAN™

## (undated) DEVICE — 3M™ MICROFOAM™ TAPE 1528-4: Brand: 3M™ MICROFOAM™

## (undated) DEVICE — HOOD, PEEL-AWAY: Brand: FLYTE

## (undated) DEVICE — SOL  .9 1000ML BAG

## (undated) DEVICE — GOWN,SIRUS,FABRIC-REINFORCED,X-LARGE: Brand: MEDLINE

## (undated) NOTE — LETTER
[facility]  Korin Jay 61 [address] [facility], [address] [place] [address]    Consent for Operation    Date: __________________    Time: _______________    1.  I authorize the performance upon Carlos Dominique the following operation:    Procedure(s):  RIGHT TOTAL HIP R procedure has been videotaped, the surgeon will obtain the original videotape. The hospital will not be responsible for storage or maintenance of this tape.     6. For the purpose of advancing medical education, I consent to the admittance of observers to t STATEMENTS REQUIRING INSERTION OR COMPLETION WERE FILLED IN.     Signature of Patient:   ___________________________    When the patient is a minor or mentally incompetent to give consent:  Signature of person authorized to consent for patient: ____________ supplements, and pills I can buy without a prescription (including street drugs/illegal medications). Failure to inform my anesthesiologist about these medicines may increase my risk of anesthetic complications.   · If I am allergic to anything or have had Anesthesiologist Signature     Date   Time  I have discussed the procedure and information above with the patient (or patient’s representative) and answered their questions. The patient or their representative has agreed to have anesthesia services.     ___

## (undated) NOTE — IP AVS SNAPSHOT
[address]            (For Outpatient Use Only) Initial Admit Date: 8/8/2018   Inpt/Obs Admit Date: Inpt: 8/8/18 / Obs: 08/09/18   Discharge Date:    Toshia Butts:  [de-identified]   MRN: [de-identified]   CSN: [id number]        [facility] Subscriber ID:  Pt Rel to Subscriber:    Hospital Account Financial Class: Medicare    August 10, 2018

## (undated) NOTE — MR AVS SNAPSHOT
[address]  [address] [facility], [place] [id number] [phone]               Thank you for choosing us for your health care visit with Fiorella Do DO.   We are glad to serve you and happy to provide you with this TAKE 1 TABLET BY MOUTH EVERY DAY   Commonly known as:  CASODEX           famoTIDine 20 MG Tabs   TAKE 1 TABLET BY MOUTH TWICE DAILY   Commonly known as:  PEPCID           hydrocortisone 1 % Oint   Apply 1 Application topically 2 (two) times daily.

## (undated) NOTE — ED AVS SNAPSHOT
[facility] in [address] [facility]    Phone:  [phone]    Fax:  [address] [place] [address]   MRN: [id number]    Department:  [facility] in [place]   Date of [place] IF THERE IS ANY CHANGE OR WORSENING OF YOUR CONDITION, CALL YOUR PRIMARY CARE PHYSICIAN AT ONCE OR RETURN IMMEDIATELY TO THE EMERGENCY DEPARTMENT.     If you have been prescribed any medication(s), please fill your prescription right away and begin taking t

## (undated) NOTE — IP AVS SNAPSHOT
Patient Demographics     Address  [address]  [place] [address] Phone  [phone] [facility]  [phone] [facility] E-mail Address  [email or web address]      Emergency Contact(s)     Name Relation Home Work Mobile    [facility] ? For hip replacement surgery, follow instructions provided by physical therapy    No smoking  ? Avoid smoking. It is known to cause breathing problems and can decrease the rate of healing. Incision care/Dressing changes  ?  Wash hands before and after d ? Surgical discomfort is normal for one to two months. ? Have realistic goals and keep a positive outlook. ? Keep pain manageable; pain should not disrupt your sleep or activities like getting out of bed or walking.   ? You may need pain medication regula or are visiting. ? Keep bed linen/clothing freshly laundered. ? Do not allow others or pets to touch your incision. ? Avoid people that have colds or the flu. ?  Your surgeon may recommend that you take antibiotics before you undergo any dental or other ? Turning in or out of surgical leg that is new  ? Increased numbness, tingling to leg  ? Inability to walk or put weight on your surgical leg      Signs of blood clot  ?  Pain, excessive tenderness, redness, or swelling in leg or calf (other than incision Resume lisinopril once systolic blood pressure is greater than 130. Follow-up Information     Ruby Webber DO. Schedule an appointment as soon as possible for a visit in 1 week.     Specialties:  Family Medicine, IP Consult to Primary Care Take 1 tablet (325 mg total) by mouth daily with breakfast.   Alexus Ochoa MD         Fexofenadine HCl 180 MG Tabs  Commonly known as:  ALLEGRA              Fluticasone Propionate 50 MCG/ACT Susp  Commonly known as:  FLONASE      SHAKE LIQUID AND USE 1 [id number] HYDROcodone-acetaminophen (NORCO) 5-325 MG per tab 1 tablet (Or Linked Group #1) 07/11/18 0629 Given      [id number] HYDROcodone-acetaminophen (NORCO) 5-325 MG per tab 1 tablet 07/11/18 1134 Given      [id number] HYDROcodone-acetaminophen (Vinnie Waddell) Author:  Jenn Hampton MD Service:  Hospitalist Author Type:  Physician    Filed:  7/8/2018  4:17 PM Date of Service:  7/8/2018  4:14 PM Status:  Signed    :   Jenn Hampton MD (Physician)         [facility]  [facility] and Physical     Robe Comment: bladder surgery  1/1/95: OTHER SURGICAL HISTORY      Comment: testis removed  6/7/13: OTHER SURGICAL HISTORY      Comment: Cysto-Dr. Jenkins Book  8/20/14: OTHER SURGICAL HISTORY      Comment: Spinal cord stimulator placed  9/17/2014: OTHER SURGICA Acetaminophen-Codeine #3 300-30 MG Oral Tab Take 1 to 2 tabs approximately one hour prior to your appointments.  Disp: 20 tablet Rfl: 0   SERTRALINE  MG Oral Tab TAKE 1 TABLET BY MOUTH EVERY DAY Disp: 90 tablet Rfl: 0   lisinopril 10 MG Oral Tab Take Invalid input(s): LYM#, MONO#, BASOS#, EOSIN#    No results for input(s): GLU, BUN, CREATSERUM, GFRAA, GFRNAA, CA, ALB, NA, K, CL, CO2, ALKPHO, AST, ALT, BILT, TP in the last 168 hours.     CrCl cannot be calculated (Patient's most recent lab result is Principal Financial History:[RW.1]  Past Medical History:   Diagnosis Date   • Acute blood loss anemia 12/24/2013   • Back pain    • Back problem     PAIN   • Cancer [facility])    • Chronic rhinitis    • Closed fracture of metacarpal bone(s), site unspecified    • Colitis     DIVE •  Oxybutynin Chloride ER (DITROPAN-XL) 24 hr tab 5 mg, 5 mg, Oral, Daily  •  TraZODone HCl (DESYREL) tab 50 mg, 50 mg, Oral, Nightly  •  cetirizine (ZYRTEC) tab 10 mg, 10 mg, Oral, Daily  •  famoTIDine (PEPCID) tab 20 mg, 20 mg, Oral, BID  •  escitalopram to follow simple commands. Face is symmetrical.  No Drift. Sensation to light touch is intact bilaterally. Motor:  No arm or leg weakness noted. Finger-to-nose coordination is intact. Gait deferred.      Laboratory Data:         Impression and Plan:[ C. difficile colitis     Volume overload     Vertebral compression fracture     Diarrhea     Lower abdominal pain     Hypernatremia     Esophageal reflux     Lumbago     Chronic rhinitis     Fecal urgency     History of colonic polyps     Hip fracture ( R wrist brace in place and asked to use with platform walker per MD.   Problem List  Principal Problem:    Hip fracture ([address]  Active Problems:    Intractable pain    Thrombocytopenia (HCC)    Intertrochanteric fracture of right hip [facility])      Past Medical Patient’s self-stated goal is to be able to walk without pain.     OBJECTIVE  Precautions: Bed/chair alarm    WEIGHT BEARING RESTRICTION  Weight Bearing Restriction: R upper extremity  R Upper Extremity: Weight Bearing as Tolerated (with R wrist splint and Comment : Above score is based on FIM definations    Skilled Therapy Provided: Patient was reeducated in Anterior hip precautions & reviewed multiple times during this session. Patient participated fairly with right LE exercises as per KATHY rehab protocol. may benefit from Sub Acute Rehab for 19 to 21 days. After this period of rehabilitation patient should achieve mod ind level in all areas of functional mobility. PLAN  PT Treatment Plan: Bed mobility; Endurance; Patient education; Family education;Gait tr  This appearance may be artifactual.  A followup MRI of the right wrist may be done for further evaluation.   R wrist brace in place and asked to use with platform walker per MD.     Problem List  Principal Problem:    Hip fracture ([address]  Active Problems: Stairs to Bedroom: 0       Lives With: Alone (has a dog)  Drives: No  Patient Owned Equipment: Rolling walker  Patient Regularly Uses: Glasses    Prior Level of [place]: per pt he was ind with all ADLs and functional mobility PTA.  Pt out walking his d wheelchair, bedside commode, etc.): A Lot   -   Moving from lying on back to sitting on the side of the bed?: A Little   How much help from another person does the patient currently need. ..   -   Moving to and from a bed to a chair (including a wheelchair) addressed;SCDs in place; Alarm set    ASSESSMENT   Patient is a 80year old male admitted on 7/8/2018 for s/p fall. Pertinent comorbidities and personal factors impacting therapy include memory issues, s/p fall.   In this PT evaluation, the patient presents Physical Therapy Note signed by Tobi Davey PT at 7/9/2018  1:09 PM  Version 1 of 1    Author:  Tobi Davey PT Service:  Rehab Author Type:  Physical Therapist    Filed:  7/9/2018  1:09 PM Date of Service:  7/9/2018  1:08 PM Status:  Signed • Chronic rhinitis    • Closed fracture of metacarpal bone(s), site unspecified    • Colitis     DIVERTICULITIS   • Contusion of hand(s)    • Depression    • Diverticulosis of colon (without mention of hemorrhage)    • Esophageal reflux    • Exposure to un ACTIVITIES OF DAILY LIVING ASSESSMENT  AM-PAC ‘6-Clicks’ Inpatient Daily Activity Short Form  How much help from another person does the patient currently need…  -   Putting on and taking off regular lower body clothing?: A Lot  -   Bathing (including wa pain. He is below his baseline and a high fall risk. The AM-CARMELA ' '6-Clicks' Inpatient Daily Activity Short Form was completed and  this patient  is demonstrating a 50% degree impairment in activities of daily living.  Research supports that patients with t Author:  Rodolfo Arteaga OT Service:  Rehab Author Type:  Occupational Therapist    Filed:  7/10/2018  2:20 PM Date of Service:  7/10/2018  1:55 PM Status:  Signed    :  Rodolfo Arteaga OT (Occupational Therapist)       OCCUPATIONAL THERAPY EV No date: CHOLECYSTECTOMY  No date: EXCIS LUMBAR DISK,ONE LEVEL  No date: HERNIA SURGERY  1/1/84: OTHER      Comment: prostatectomy  No date: OTHER SURGICAL HISTORY      Comment: bladder surgery  1/1/95: OTHER SURGICAL HISTORY      Comment: testis removed Orientation Level:  oriented to situation and oriented to person  Memory:  impaired working memory  Following Commands:  follows one step commands without difficulty  Awareness of Errors:  assistance required to identify errors made and assistance required and limitations related to self care. Patient able to transfer supine to sit with min assist verbal cues and increased time.  He was cued on safe hand and foot placement for sit to stand, required min A x 2 to stand; needed moderate assist and moderate cues activities of daily living. Research supports that patients with this level of impairment often benefit from SNF/LTAC. Subacute rehab is recommended for 19-21 days.   After this period of rehabilitation patient should achieve supervision to modified indeppretty Video Swallow Study Notes     No notes of this type exist for this encounter. SLP Notes     No notes of this type exist for this encounter.             Immunizations     Name Date      INFLUENZA 10/11/16     Pneumococcal (Prevnar 13) 10/11/16

## (undated) NOTE — ED AVS SNAPSHOT
Sundar Lopez   MRN: [id number]    Department:  [facility] Emergency Department   Date of Visit:  9/5/2018           Disclosure     Insurance plans vary and the physician(s) referred by the ER may not be covered by your plan.  Please contact y tell this physician (or your personal doctor if your instructions are to return to your personal doctor) about any new or lasting problems. The primary care or specialist physician will see patients referred from the [facility] Emergency Department.  Charo Gray

## (undated) NOTE — MR AVS SNAPSHOT
[address]  [address] [address] [phone]               Thank you for choosing us for your health care visit with Froylan Quintero NP.   We are glad to serve you and happy to provide you with physician's office. At that time, you will be provided with any authorization numbers or be assured that none are required. You can then schedule your appointment.  Failure to obtain required authorization numbers can create reimbursement difficulties for y TAKE 1 TABLET(5 MG) BY MOUTH DAILY   Commonly known as:  PRINIVIL,ZESTRIL           loratadine 10 MG Tabs   Take 10 mg by mouth daily. Commonly known as:  CLARITIN           Saw [place] 1000 MG Caps   Take by mouth.            Sertraline HCl 100 MG Tabs

## (undated) NOTE — IP AVS SNAPSHOT
[address]            (For Outpatient Use Only) Initial Admit Date: 3/9/2021   Inpt/Obs Admit Date: Inpt: N/A / Obs: 03/10/21   Discharge Date:    Yenifer Connell:  [de-identified]   MRN: [de-identified]   CSN: [id number]   CEID: [id number] Subscriber: SELF   TERTIARY INSURANCE   Payor:  Plan:    Group Number:  Insurance Type:    Subscriber Name:  Subscriber :    Subscriber ID:  Pt Rel to Subscriber:    Hospital Account Financial Class: Medicare    2021

## (undated) NOTE — ED AVS SNAPSHOT
[facility] Emergency Department in [address] [facility]    Phone:  [phone]    Fax:  [address] [facility]   MRN: [id number]    Department:  [facility] Emergency Department in [place]   Date of [place] have any questions regarding your home medications, including potential side effects.               Medication List      START taking these medications     Ciprofloxacin HCl 500 MG Tabs   Quantity:  20 tablet   Commonly known as:  CIPRO   Take 1 tablet (500 visit does not uncover every injury or illness.  If you have been referred to a primary care or a specialist physician for a follow-up visit, please tell this physician (or your personal doctor if your instructions are to return to your personal doctor) abo Hermila López [address] [facility] [address]) [phone]                Additional Information       We are concerned for your overall well being:    - If you are a smoker or have smoked in the last 12 months, we encourage you to explore options for ARTURO FARRAR [facility] arthroplasty of L2 is noted. Compression deformity of L1 is noted. XR CHEST PA + LAT CHEST (CPT=71020) (Final result) Result time:  06/11/17 11:02:06    Final result    Impression:    CONCLUSION:  No consolidation.            Dictated by: Ligia Yeboah

## (undated) NOTE — IP AVS SNAPSHOT
Patient Demographics     Address  [address]  [place] [address] Phone  [phone] [facility]  [phone] [facility] E-mail Address  [email or web address]      Emergency Contact(s)     Name Relation Home Work Mobile    [facility] Fluticasone Propionate 50 MCG/ACT Susp  Commonly known as:  FLONASE      SHAKE LIQUID AND USE 1 SPRAY INTO EACH NOSTRIL DAILY   Heather Fitzpatrick DO         HYDROcodone-acetaminophen 5-325 MG Tabs  Commonly known as:  NORCO      Take 1 tablet by mouth every [id number] 3350 (MIRALAX) powder packet 17 g 08/10/18 1057 Given      [id number] Sertraline HCl (ZOLOFT) tab 100 mg 08/10/18 0813 Given      [id number] bicalutamide (CASODEX) tab 50 mg 08/10/18 0841 Given      [id number] cetirizine (ZYRTEC) tab 10 mg 08/1 Meropenem <=0.25  Sensitive    Nitrofurantoin <=16  Sensitive    Piperacillin + Tazobactam <=4  Sensitive    Trimethoprim/Sulfa <=20  Sensitive                        H&P - H&P Note      H&P signed by Nettie Denney MD at 8/9/2018  2:33 PM  Version 1 of 1 • OTHER DISEASES    • Personal history of antineoplastic chemotherapy     1980S   • Personal history of contact with and (suspected) exposure to asbestos    • Unspecified essential hypertension    • Visual impairment     glasses        Past Surgical Histor SERTRALINE  MG Oral Tab TAKE 1 TABLET BY MOUTH EVERY DAY Disp: 90 tablet Rfl: 0   loratadine 10 MG Oral Tab Take 10 mg by mouth daily.  Disp:  Rfl:    ALPRAZolam 0.25 MG Oral Tab Take 1 tablet (0.25 mg total) by mouth 2 (two) times daily as needed fo Musculoskeletal: Moves all extremities. Extremities: No edema or cyanosis. Integument: No rashes or lesions. Psychiatric: Appropriate mood and affect.       Diagnostic Data:      Labs:  Recent Labs   Lab  08/08/18   0750   WBC  6.4   HGB  11.2*   MCV  9 No notes of this type exist for this encounter. Occupational Therapy Notes (last 72 hours) (Notes from 8/7/2018  6:39 PM through 8/10/2018  6:39 PM)     No notes of this type exist for this encounter.       Video Swallow Study Notes     No notes of th

## (undated) NOTE — IP AVS SNAPSHOT
[address]            (For Outpatient Use Only) Initial Admit Date: 2/28/2021   Inpt/Obs Admit Date: Inpt: 2/28/21 / Obs: N/A   Discharge Date:    Skip Iam:  [de-identified]   MRN: [de-identified]   CSN: [id number]   CEID: [id number] [employer]ANCE   Payor:  Plan:    Group Number:  Insurance Type:    Subscriber Name:  Subscriber :    Subscriber ID:  Pt Rel to Subscriber:    Hospital Account Financial Class: Medicare    2021

## (undated) NOTE — LETTER
[facility]  Korin Dimastulio 61 [address] [facility], [address] [place] [address]    Consent for Operation    Date: __________________    Time: _______________    1.  I authorize the performance upon Jerod Martinez the following operation:    Procedure(s):  HEMIARTHROPLASTY procedure has been videotaped, the surgeon will obtain the original videotape. The hospital will not be responsible for storage or maintenance of this tape.     6. For the purpose of advancing medical education, I consent to the admittance of observers to t STATEMENTS REQUIRING INSERTION OR COMPLETION WERE FILLED IN.     Signature of Patient:   ___________________________    When the patient is a minor or mentally incompetent to give consent:  Signature of person authorized to consent for patient: ____________ supplements, and pills I can buy without a prescription (including street drugs/illegal medications). Failure to inform my anesthesiologist about these medicines may increase my risk of anesthetic complications.   · If I am allergic to anything or have had Anesthesiologist Signature     Date   Time  I have discussed the procedure and information above with the patient (or patient’s representative) and answered their questions. The patient or their representative has agreed to have anesthesia services.     ___

## (undated) NOTE — LETTER
19          Boaz Nair   12/15/1931          This is a letter to whom it may concern. I am writing this letter on behalf of my full-time medical patient Mr. Meaghan Shi.  This gentleman is under my full-time medical care for a vari

## (undated) NOTE — LETTER
19      Julia Gillespie   12/15/1931          This is a letter to whom it may concern. I am writing this letter on behalf of my full-time medical patient Mr. Chery Letitia polanco.   This gentleman is under my full-time medical care for a v

## (undated) NOTE — ED AVS SNAPSHOT
Boaz Nair   MRN: [id number]    Department:  [facility] Emergency Department   Date of Visit:  11/4/2018           Disclosure     Insurance plans vary and the physician(s) referred by the ER may not be covered by your plan.  Please contact tell this physician (or your personal doctor if your instructions are to return to your personal doctor) about any new or lasting problems. The primary care or specialist physician will see patients referred from the [facility] Emergency Department.  Mercy Deluna

## (undated) NOTE — LETTER
17    Patient: Margie   : 12/15/1931 Visit date: 2017    Dear  Dr. Peter Carlson,    Thank you for referring Margie Barajas to my practice. Please find my assessment and plan below.               Assessment   Constipation, unspec

## (undated) NOTE — ED AVS SNAPSHOT
[facility] Emergency Department    [place]  [address] [address]    Phone:  [phone]    Fax:  [address] [facility]   MRN: [id number]    Department:  [facility] Emergency Department   Date of Visit:  1 IF THERE IS ANY CHANGE OR WORSENING OF YOUR CONDITION, CALL YOUR PRIMARY CARE PHYSICIAN AT ONCE OR RETURN IMMEDIATELY TO THE EMERGENCY DEPARTMENT.     If you have been prescribed any medication(s), please fill your prescription right away and begin taking t

## (undated) NOTE — ED AVS SNAPSHOT
Stephanie Fajardo   MRN: [id number]    Department:  [facility] Emergency Department   Date of Visit:  10/31/2018           Disclosure     Insurance plans vary and the physician(s) referred by the ER may not be covered by your plan.  Please contact tell this physician (or your personal doctor if your instructions are to return to your personal doctor) about any new or lasting problems. The primary care or specialist physician will see patients referred from the [facility] Emergency Department.  Kaden Hernandez

## (undated) NOTE — IP AVS SNAPSHOT
Patient Demographics     Address  [address] [address]  [address]  [facility] [place] [facility] [address] Phone  [phone] (Home) *Preferred*  [phone] [facility]) E-mail Address  [email or web address]      Emergency Contact(s)     Name Relation Home Work Toni 3/12      Take 1 tablet (325 mg total) by mouth daily with breakfast.   Wilmar , DO         HYDROcodone-acetaminophen 5-325 MG Tabs  Commonly known as: NORCO      Take 1 tablet by mouth every 4 (four) hours as needed.    Hafsa Cue, DO         lid prescriptions at the location directed by your doctor or nurse    Bring a paper prescription for each of these medications  HYDROcodone-acetaminophen 5-325 MG Tabs  lidocaine-menthol 4-1 % Ptch           [id number]-G - MAR ACTION REPORT  (last 24 hrs)    ** 100/57 Filed at 03/11/2021 1401   Pulse  89 Filed at 03/11/2021 1401   Resp  19 Filed at 03/11/2021 1207   Temp  98.1 °F (36.7 °C) Filed at 03/11/2021 1207   SpO2  96 % Filed at 03/11/2021 1207      Patient's Most Recent Weight       Most Recent Value   Pa 450.0 10(3)uL — [facility] ([facility])   MCV 93.5 80.0 - 100.0 fL — [facility])   MCH 30.9 26.0 - 34.0 pg — [facility] ([facility])   MCHC 33.0 31.0 - 37.0 g/dL — [facility])   RDW 14.0 11.0 - 15.0 % — [facility])   RDW-SD 47.8 35.1 - 46.3 fL JOSH Pearson though had BM yesterday with some relief. Denies dysuria, cough, vomiting, fevers. Just generally feels low energy and weak. BP improving with fluids in ER.     Past Medical History:  Past Medical History:   Diagnosis Date   • Acute blood loss anemia 12/2 bladder surgery   • OTHER SURGICAL HISTORY  1/1/95    testis removed   • OTHER SURGICAL HISTORY  6/7/13    Cysto-Dr. Arthur Jordan   • OTHER SURGICAL HISTORY  8/20/14    Spinal cord stimulator placed   • OTHER SURGICAL HISTORY  02/04/2019    Cysto  Dr. Kaylie Pack WEEK ON SATURDAY, Disp: 4 tablet, Rfl: 4  TRAZODONE HCL 50 MG Oral Tab, TAKE 1 TABLET BY MOUTH AT BEDTIME, Disp: 90 tablet, Rfl: 0  FAMOTIDINE 20 MG Oral Tab, TAKE 1 TABLET BY MOUTH TWICE DAILY, Disp: 180 tablet, Rfl: 0  BICALUTAMIDE 50 MG Oral Tab, TAKE 1 Psychiatric: Appropriate mood and affect.       Diagnostic Data:      Labs:  Recent Labs   Lab 03/09/21  2104   WBC 9.7   HGB 13.5   MCV 90.6   .0       Recent Labs   Lab 03/03/21  0606 03/09/21  2104   GLU 83 101*   BUN 12 32*   CREATSERUM 1.05 1.89 CLINT.2 - Marisol Alejandro MD on 3/9/2021 11:19 PM  CLINT. 3 - Marisol Alejandro MD on 3/10/2021  3:39 AM               H&P signed by Marisol Alejandro MD at 3/9/2021 11:19 PM  Version 2 of 3    Author: Marisol Alejandro MD Service: Yaneth Boateng Author Type: Physician    Filed: 3/ impairment     glasses        Past Surgical History:   Past Surgical History:   Procedure Laterality Date   • BACK SURGERY  6/3/11    L4-5 decompression   • CHOLECYSTECTOMY     • ESOPHAGOGASTRODUODENOSCOPY (EGD) N/A 9/17/2014    Performed by Demi Stearns Penicillins             UNKNOWN  Keflex                  ITCHING    Comment:CAPS    Medications:  No current facility-administered medications on file prior to encounter.   Sulfamethoxazole-TMP -160 MG Oral Tab per tablet, Take 1 tablet by mouth every 14 point review of systems was completed. Pertinent positives and negatives noted in the HPI.     Physical Exam:    /67   Pulse 69   Temp 97.5 °F (36.4 °C) (Temporal)   Resp 20   Ht 5' 8\" (1.727 m)   Wt 134 lb 7.7 oz (61 kg)   SpO2 95%   BMI 20.45 constipated lately[JM.2]  4. Hyponatremia - probably due to dehydration so will monitor response to fluids; however also has hx SIADH so need to monitor response closely  5. HUY due to dehydration-monitor response to fluids  6. Hx prostate cancer  7.  Hx SI blood loss anemia 12/24/2013   • Back pain    • Back problem     PAIN   • BPH (benign prostatic hyperplasia)    • Cancer [facility])     prostate   • Chronic rhinitis    • Closed fracture of metacarpal bone(s), site unspecified    • Colitis     DIVERTICULITIS 02/04/2019    Cysto  Dr. Marnie Sr    • REPAIR ING HERNIA,5+Y/O,REDUCIBL     • SACROILIAC JOINT INJECTION AND LUMBAR FACET INJECTION Bilateral 2/4/2014    Performed by Marivel Tapia MD at [facility] MAIN OR   • SPINAL CORD STIMULATION TRIAL N/A 7/24/2014    Perform BICALUTAMIDE 50 MG Oral Tab, TAKE 1 TABLET BY MOUTH EVERY DAY, Disp: 90 tablet, Rfl: 0  ALPRAZolam 0.25 MG Oral Tab, Take 1-2 tablet by mouth every 12 hours as needed, Disp: 120 tablet, Rfl: 0  lisinopril 5 MG Oral Tab, Take 5 mg by mouth daily. , Disp: , R BUN 12 32*   CREATSERUM 1.05 1.89*   GFRAA 72 36*   GFRNAA 63 31*   CA 8.8 9.1   ALB  --  3.5   * 131*   K 4.6 5.0    97*   CO2 27.0 26.0   ALKPHO  --  124*   AST  --  14*   ALT  --  13*   BILT  --  0.3   TP  --  7.6       Estimated Creatinine SUMMARY     Tanya Flight Patient Status:  Observation    12/15/1931 MRN [id number]   [facility] 8NE-A Attending Remy Dickerson, [address] [facility] Day # 0 PCP Deondre Woodson DO     Date of Admission: 3/9/2021  Date of Discharge: 3/11/2021  Jodie aPndey known as: FOSAMAX      Take 70 mg by mouth once a week. On saturdays   Refills: 0     Arginaid Pack      Take 1 Package by mouth 3 (three) times daily.    Refills: 0     bicalutamide 50 MG Tabs  Commonly known as: CASODEX      Take 50 mg by mouth every even 0     zinc sulfate 220 (50 Zn) MG Caps  Commonly known as: ZINCATE      Take 220 mg by mouth daily.    Refills: 0        STOP taking these medications    ALPRAZolam 0.25 MG Tabs  Commonly known as: XANAX        bumetanide 1 MG Tabs  Commonly known as: Trenda Efrain 2:49 PM)    No notes of this type exist for this encounter. Occupational Therapy Notes (last 72 hours) (Notes from 3/8/2021  2:49 PM through 3/11/2021  2:49 PM)    No notes of this type exist for this encounter.      Video Swallow Study Notes    No note

## (undated) NOTE — LETTER
07/06/20        [address] [address]      Dear [facility] records indicate that you have outstanding lab work and or testing that was ordered for you and has not yet been completed:  Orders Place

## (undated) NOTE — IP AVS SNAPSHOT
[address]            (For Outpatient Use Only) Initial Admit Date: 2022   Inpt/Obs Admit Date: Inpt: 22 / Obs: N/A   Discharge Date:    Cece Larson:  [de-identified]   MRN: [de-identified]   CSN: [id number]   CEID: [id number]        ENCOUNTER  Patient Class: Inpatient Admitting Provider: Ana Billingsley MD Unit: [facility] 4SW-A   Hospital Service: ICU Attending Provider: Zoë Wade MD   Bed: 457-A   Visit Type:   Referring Physician: No ref. provider found Billing Flag:    Admit Diagnosis: Hypotension, unspecified hypotension type [I95.9]      PATIENT  Legal Name:   Libby Garsia   Legal Sex: Male  Gender ID:              Pref Name:    PCP:   Home: [phone]   Address:  [address] [place] [address] [address] : 12/15/1931 (89 yrs) Mobile: [phone]         City/State/Zip: [facility] [address] Marital:  Language: [address] [place] [address] Drive: [facility] SSN4: [id number] [place]: [place] Not L*     Race: White Ethnicity: Non  Or  O*   EMERGENCY CONTACT   Name Relationship Legal Guardian? Home Phone Work Phone Mobile Phone   1. Chase Seymour  2.  Iliana Palacios Son  Daughter          [phone]     GUARANTOR  Guarantor: Aspen Ramsey : 12/15/1931 Home Phone: [phone]   Address: [address] [place] [address] [address]  Sex: Male Work Phone:    City/State/Zip: [facility]   Rel. to Patient: Self Guarantor ID: [id number]   [address]   Employer:  Status: RETIRED     COVERAGE  PRIMARY INSURANCE   Payor: MEDICARE Plan: MEDICARE PART A&B   Group Number:  Insurance Type: INDEMNITY   Subscriber Name: Brionna Billingsley : 12/15/1931   Subscriber ID: [id number] Pt Rel to Subscriber: Self   SECONDARY INSURANCE   Payor: MEDICAID Plan: MEDICAID   Group Number:  Insurance Type: INDEMNITY   Subscriber Name: Brionna Billingsley : 12/15/1931   Subscriber ID: [id number] Pt Rel to Subscriber: SELF   TERTIARY INSURANCE   Payor:  Plan:    Group Number:  Insurance Type:    Subscriber Name:  Subscriber :    Subscriber ID:  Pt Rel to Subscriber:    Hospital Account Financial Class: Medicare    December 10, 2022

## (undated) NOTE — ED AVS SNAPSHOT
[facility] Emergency Department    [address] [address]    Phone:  [phone]    Fax:  [address] [facility]   MRN: [id number]    Department:  [facility] Emergency Department   Date of Visit:  1 coverage for follow-up care and referrals. [address] [facility] [phone]  Pediatric 443 3314 Emergency Department   [phone]       To Check ER Wait Times:  TEXT 'ERwait' to 48094      Click [email or web address] will be contacted. Please make sure we have your correct phone number before you leave. After you leave, you should follow the attached instructions. I have read and understand the instructions given to me by my caregivers.         [facility] Call the [employer] for assistance with your inactive [employer] account    If you have questions, you can call [phone] to talk to our [facility] Staff. Remember, [employer] is NOT to be used for urgent needs. For medical emergencies, dial 911.     Vi

## (undated) NOTE — ED AVS SNAPSHOT
Julia Gillespie   MRN: [id number]    Department:  [facility] Emergency Department   Date of Visit:  10/1/2018           Disclosure     Insurance plans vary and the physician(s) referred by the ER may not be covered by your plan.  Please contact tell this physician (or your personal doctor if your instructions are to return to your personal doctor) about any new or lasting problems. The primary care or specialist physician will see patients referred from the [facility] Emergency Department.  Cecilia Emanuel

## (undated) NOTE — ED AVS SNAPSHOT
Zaida El   MRN: [id number]    Department:  [facility] Emergency Department   Date of Visit:  9/7/2018           Disclosure     Insurance plans vary and the physician(s) referred by the ER may not be covered by your plan.  Please contact y tell this physician (or your personal doctor if your instructions are to return to your personal doctor) about any new or lasting problems. The primary care or specialist physician will see patients referred from the [facility] Emergency Department.  Wing Hicks

## (undated) NOTE — ED AVS SNAPSHOT
Linda Joy   MRN: [id number]    Department:  [facility] Emergency Department   Date of Visit:  11/10/2018           Disclosure     Insurance plans vary and the physician(s) referred by the ER may not be covered by your plan.  Please contact tell this physician (or your personal doctor if your instructions are to return to your personal doctor) about any new or lasting problems. The primary care or specialist physician will see patients referred from the [facility] Emergency Department.  Trever Patel